# Patient Record
Sex: MALE | Race: WHITE | Employment: FULL TIME | URBAN - METROPOLITAN AREA
[De-identification: names, ages, dates, MRNs, and addresses within clinical notes are randomized per-mention and may not be internally consistent; named-entity substitution may affect disease eponyms.]

---

## 2017-04-11 ENCOUNTER — OFFICE VISIT (OUTPATIENT)
Dept: INTERNAL MEDICINE | Facility: CLINIC | Age: 32
End: 2017-04-11

## 2017-04-11 ENCOUNTER — HOSPITAL ENCOUNTER (INPATIENT)
Facility: CLINIC | Age: 32
LOS: 8 days | Discharge: HOME | DRG: 026 | End: 2017-04-19
Attending: EMERGENCY MEDICINE | Admitting: OTOLARYNGOLOGY
Payer: COMMERCIAL

## 2017-04-11 VITALS
DIASTOLIC BLOOD PRESSURE: 91 MMHG | WEIGHT: 246 LBS | OXYGEN SATURATION: 97 % | TEMPERATURE: 98.6 F | HEART RATE: 86 BPM | RESPIRATION RATE: 18 BRPM | SYSTOLIC BLOOD PRESSURE: 136 MMHG | BODY MASS INDEX: 34.31 KG/M2

## 2017-04-11 DIAGNOSIS — K59.03 DRUG-INDUCED CONSTIPATION: ICD-10-CM

## 2017-04-11 DIAGNOSIS — G96.00 CSF LEAK: ICD-10-CM

## 2017-04-11 DIAGNOSIS — R09.81 NASAL CONGESTION: ICD-10-CM

## 2017-04-11 DIAGNOSIS — G89.18 ACUTE POST-OPERATIVE PAIN: Primary | ICD-10-CM

## 2017-04-11 DIAGNOSIS — J34.89 DRAINAGE FROM NOSE: Primary | ICD-10-CM

## 2017-04-11 DIAGNOSIS — G96.01 CSF LEAK FROM NOSE: ICD-10-CM

## 2017-04-11 DIAGNOSIS — J34.89 DRAINAGE FROM NOSE: ICD-10-CM

## 2017-04-11 PROBLEM — R03.0 ELEVATED BLOOD PRESSURE READING WITHOUT DIAGNOSIS OF HYPERTENSION: Status: ACTIVE | Noted: 2017-04-11

## 2017-04-11 LAB
ALBUMIN SERPL-MCNC: 3.9 G/DL (ref 3.4–5)
ALP SERPL-CCNC: 67 U/L (ref 40–150)
ALT SERPL W P-5'-P-CCNC: 60 U/L (ref 0–70)
ANION GAP SERPL CALCULATED.3IONS-SCNC: 7 MMOL/L (ref 3–14)
AST SERPL W P-5'-P-CCNC: 42 U/L (ref 0–45)
BASOPHILS # BLD AUTO: 0.1 10E9/L (ref 0–0.2)
BASOPHILS NFR BLD AUTO: 0.6 %
BILIRUB SERPL-MCNC: 0.7 MG/DL (ref 0.2–1.3)
BUN SERPL-MCNC: 10 MG/DL (ref 7–30)
CALCIUM SERPL-MCNC: 9.3 MG/DL (ref 8.5–10.1)
CHLORIDE SERPL-SCNC: 103 MMOL/L (ref 94–109)
CO2 SERPL-SCNC: 28 MMOL/L (ref 20–32)
CREAT SERPL-MCNC: 0.82 MG/DL (ref 0.66–1.25)
DIFFERENTIAL METHOD BLD: NORMAL
EOSINOPHIL # BLD AUTO: 0.2 10E9/L (ref 0–0.7)
EOSINOPHIL NFR BLD AUTO: 2.1 %
ERYTHROCYTE [DISTWIDTH] IN BLOOD BY AUTOMATED COUNT: 13 % (ref 10–15)
GFR SERPL CREATININE-BSD FRML MDRD: NORMAL ML/MIN/1.7M2
GLUCOSE SERPL-MCNC: 81 MG/DL (ref 70–99)
GLUCOSE URINE: 250 MG/DL
HCT VFR BLD AUTO: 44.3 % (ref 40–53)
HGB BLD-MCNC: 15 G/DL (ref 13.3–17.7)
IMM GRANULOCYTES # BLD: 0.1 10E9/L (ref 0–0.4)
IMM GRANULOCYTES NFR BLD: 0.5 %
INR PPP: 1.08 (ref 0.86–1.14)
LYMPHOCYTES # BLD AUTO: 1.5 10E9/L (ref 0.8–5.3)
LYMPHOCYTES NFR BLD AUTO: 15.5 %
MCH RBC QN AUTO: 29.5 PG (ref 26.5–33)
MCHC RBC AUTO-ENTMCNC: 33.9 G/DL (ref 31.5–36.5)
MCV RBC AUTO: 87 FL (ref 78–100)
MONOCYTES # BLD AUTO: 0.5 10E9/L (ref 0–1.3)
MONOCYTES NFR BLD AUTO: 5 %
NEUTROPHILS # BLD AUTO: 7.4 10E9/L (ref 1.6–8.3)
NEUTROPHILS NFR BLD AUTO: 76.3 %
NRBC # BLD AUTO: 0 10*3/UL
NRBC BLD AUTO-RTO: 0 /100
PLATELET # BLD AUTO: 415 10E9/L (ref 150–450)
POTASSIUM SERPL-SCNC: 3.9 MMOL/L (ref 3.4–5.3)
PROT SERPL-MCNC: 8.4 G/DL (ref 6.8–8.8)
RBC # BLD AUTO: 5.08 10E12/L (ref 4.4–5.9)
SODIUM SERPL-SCNC: 139 MMOL/L (ref 133–144)
WBC # BLD AUTO: 9.7 10E9/L (ref 4–11)

## 2017-04-11 PROCEDURE — 99285 EMERGENCY DEPT VISIT HI MDM: CPT | Mod: 25 | Performed by: EMERGENCY MEDICINE

## 2017-04-11 PROCEDURE — 85610 PROTHROMBIN TIME: CPT | Performed by: EMERGENCY MEDICINE

## 2017-04-11 PROCEDURE — 25000132 ZZH RX MED GY IP 250 OP 250 PS 637: Performed by: OTOLARYNGOLOGY

## 2017-04-11 PROCEDURE — 25000125 ZZHC RX 250

## 2017-04-11 PROCEDURE — 09JK4ZZ INSPECTION OF NASAL MUCOSA AND SOFT TISSUE, PERCUTANEOUS ENDOSCOPIC APPROACH: ICD-10-PCS | Performed by: OTOLARYNGOLOGY

## 2017-04-11 PROCEDURE — 99285 EMERGENCY DEPT VISIT HI MDM: CPT | Mod: Z6 | Performed by: EMERGENCY MEDICINE

## 2017-04-11 PROCEDURE — 25000132 ZZH RX MED GY IP 250 OP 250 PS 637: Performed by: EMERGENCY MEDICINE

## 2017-04-11 PROCEDURE — 12000001 ZZH R&B MED SURG/OB UMMC

## 2017-04-11 RX ORDER — METOCLOPRAMIDE HYDROCHLORIDE 5 MG/ML
10 INJECTION INTRAMUSCULAR; INTRAVENOUS EVERY 6 HOURS PRN
Status: DISCONTINUED | OUTPATIENT
Start: 2017-04-11 | End: 2017-04-19 | Stop reason: HOSPADM

## 2017-04-11 RX ORDER — PROCHLORPERAZINE 25 MG
25 SUPPOSITORY, RECTAL RECTAL EVERY 12 HOURS PRN
Status: DISCONTINUED | OUTPATIENT
Start: 2017-04-11 | End: 2017-04-19 | Stop reason: HOSPADM

## 2017-04-11 RX ORDER — OXYCODONE HYDROCHLORIDE 5 MG/1
5-10 TABLET ORAL EVERY 4 HOURS PRN
Status: DISCONTINUED | OUTPATIENT
Start: 2017-04-11 | End: 2017-04-15

## 2017-04-11 RX ORDER — LIDOCAINE HYDROCHLORIDE 10 MG/ML
10 INJECTION, SOLUTION INFILTRATION; PERINEURAL
Status: DISCONTINUED | OUTPATIENT
Start: 2017-04-11 | End: 2017-04-19 | Stop reason: HOSPADM

## 2017-04-11 RX ORDER — LIDOCAINE HYDROCHLORIDE 10 MG/ML
INJECTION, SOLUTION INFILTRATION; PERINEURAL
Status: COMPLETED
Start: 2017-04-11 | End: 2017-04-11

## 2017-04-11 RX ORDER — PROCHLORPERAZINE MALEATE 5 MG
5-10 TABLET ORAL EVERY 6 HOURS PRN
Status: DISCONTINUED | OUTPATIENT
Start: 2017-04-11 | End: 2017-04-19 | Stop reason: HOSPADM

## 2017-04-11 RX ORDER — ACETAMINOPHEN 325 MG/1
650 TABLET ORAL EVERY 4 HOURS PRN
Status: DISCONTINUED | OUTPATIENT
Start: 2017-04-11 | End: 2017-04-15

## 2017-04-11 RX ORDER — ACETAMINOPHEN 500 MG
1000 TABLET ORAL ONCE
Status: COMPLETED | OUTPATIENT
Start: 2017-04-11 | End: 2017-04-11

## 2017-04-11 RX ORDER — POLYETHYLENE GLYCOL 3350 17 G/17G
17 POWDER, FOR SOLUTION ORAL DAILY PRN
Status: DISCONTINUED | OUTPATIENT
Start: 2017-04-11 | End: 2017-04-19 | Stop reason: HOSPADM

## 2017-04-11 RX ORDER — NALOXONE HYDROCHLORIDE 0.4 MG/ML
.1-.4 INJECTION, SOLUTION INTRAMUSCULAR; INTRAVENOUS; SUBCUTANEOUS
Status: DISCONTINUED | OUTPATIENT
Start: 2017-04-11 | End: 2017-04-19 | Stop reason: HOSPADM

## 2017-04-11 RX ORDER — BISACODYL 10 MG
10 SUPPOSITORY, RECTAL RECTAL DAILY PRN
Status: DISCONTINUED | OUTPATIENT
Start: 2017-04-11 | End: 2017-04-19 | Stop reason: HOSPADM

## 2017-04-11 RX ORDER — AMOXICILLIN 250 MG
1-2 CAPSULE ORAL 2 TIMES DAILY
Status: DISCONTINUED | OUTPATIENT
Start: 2017-04-11 | End: 2017-04-19 | Stop reason: HOSPADM

## 2017-04-11 RX ORDER — ONDANSETRON 2 MG/ML
4 INJECTION INTRAMUSCULAR; INTRAVENOUS EVERY 6 HOURS PRN
Status: DISCONTINUED | OUTPATIENT
Start: 2017-04-11 | End: 2017-04-19 | Stop reason: HOSPADM

## 2017-04-11 RX ORDER — BISACODYL 5 MG
5-15 TABLET, DELAYED RELEASE (ENTERIC COATED) ORAL DAILY PRN
Status: DISCONTINUED | OUTPATIENT
Start: 2017-04-11 | End: 2017-04-19 | Stop reason: HOSPADM

## 2017-04-11 RX ORDER — ONDANSETRON 4 MG/1
4 TABLET, ORALLY DISINTEGRATING ORAL EVERY 6 HOURS PRN
Status: DISCONTINUED | OUTPATIENT
Start: 2017-04-11 | End: 2017-04-19 | Stop reason: HOSPADM

## 2017-04-11 RX ORDER — SODIUM CHLORIDE 9 MG/ML
INJECTION, SOLUTION INTRAVENOUS CONTINUOUS
Status: DISCONTINUED | OUTPATIENT
Start: 2017-04-12 | End: 2017-04-19 | Stop reason: HOSPADM

## 2017-04-11 RX ORDER — METOCLOPRAMIDE 10 MG/1
10 TABLET ORAL EVERY 6 HOURS PRN
Status: DISCONTINUED | OUTPATIENT
Start: 2017-04-11 | End: 2017-04-19 | Stop reason: HOSPADM

## 2017-04-11 RX ADMIN — Medication 1 SPRAY: at 19:42

## 2017-04-11 RX ADMIN — LIDOCAINE HYDROCHLORIDE: 10 INJECTION, SOLUTION INFILTRATION; PERINEURAL at 19:35

## 2017-04-11 RX ADMIN — ACETAMINOPHEN 1000 MG: 500 TABLET, FILM COATED ORAL at 19:41

## 2017-04-11 RX ADMIN — OXYCODONE HYDROCHLORIDE 5 MG: 5 TABLET ORAL at 23:43

## 2017-04-11 RX ADMIN — SENNOSIDES AND DOCUSATE SODIUM 2 TABLET: 8.6; 5 TABLET ORAL at 22:32

## 2017-04-11 RX ADMIN — OXYCODONE HYDROCHLORIDE 5 MG: 5 TABLET ORAL at 22:28

## 2017-04-11 RX ADMIN — Medication 1 MG: at 22:28

## 2017-04-11 ASSESSMENT — ENCOUNTER SYMPTOMS
SPEECH DIFFICULTY: 0
CONSTIPATION: 0
VOMITING: 0
WEAKNESS: 0
CHILLS: 0
SHORTNESS OF BREATH: 0
RHINORRHEA: 1
HEADACHES: 1
PHOTOPHOBIA: 1
FEVER: 0
ABDOMINAL PAIN: 0
FATIGUE: 0
NUMBNESS: 0
NAUSEA: 0
COUGH: 1
DIARRHEA: 0

## 2017-04-11 ASSESSMENT — VISUAL ACUITY: OU: NORMAL ACUITY

## 2017-04-11 ASSESSMENT — PAIN SCALES - GENERAL: PAINLEVEL: MODERATE PAIN (4)
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
## 2017-04-11 NOTE — IP AVS SNAPSHOT
MRN:[id number]                      After Visit Summary   4/11/2017    Khadar Weems    MRN: [id number]           Thank you!     Thank you for choosing [facility] for your care. Our goal is always to provide you with excellent care. Hearing back from our patients is one way we can continue to improve our services. Please take a few minutes to complete the written survey that you may receive in the mail after you visit with us. Thank you!        Patient Information     Date Of Birth          1985        Designated Caregiver       Most Recent Value    Caregiver    Will someone help with your care after discharge? no      About your hospital stay     You were admitted on:  April 11, 2017 You last received care in the:  [address] [facility]    You were discharged on:  April 19, 2017        Reason for your hospital stay       Post-operative care                  Who to Call     For medical emergencies, please call 911.  For non-urgent questions about your medical care, please call your primary care provider or clinic, [phone]  For questions related to your surgery, please call your surgery clinic        Attending Provider     Provider Specialty    Liza De La Cruz MD Emergency Medicine    Marcelino Duvall MD Otolaryngology    Emily Person MD Otolaryngology       Primary Care Provider Office Phone # Fax #    DALE Saxena [facility] [phone]       [facility] [address] [place] [address]         When to contact your care team       Please notify your doctor if you experience persistent nasal drainage, sustained bleeding from the nose, or increasing redness, swelling, and/or purulent malorodorous discharge from the nose which may indicate infection. Notify your doctor if you experience worsening headaches, vision changes, neck pain/stiffness. If you feel it is acute, or experience sudden changes in breathing, chest pain, or  "excessive sleepiness/somnolence please return to the emergency department or call 911. If you have questions or concerns during the day please call ENT clinic and [phone]. If at night you can call [facility] at [phone] and ask for the \"ENT resident on call\".                  After Care Instructions     Activity       Your activity upon discharge: No heavy lifting greater than 10 lbs and no strenuous exercise for 2 weeks or until follow up appointment. No driving while taking narcotic pain medications.            Diet       Follow this diet upon discharge: Regular diet            Discharge Instructions       Use nasal saline spray to help remove crusting from the nose. No nose blowing, no straining, no lifting greater than 10 lbs, no strenuous activity.                  Follow-up Appointments     [facility] Follow-up and recommended labs and tests       Follow up in ENT clinic with Dr. Person in 2-3 weeks for removal of nasal splints. Please call the clinic with questions/concerns: [phone].    Otolaryngology/ENT Clinic:  [facility]  [address] [place] [address]      Appointments on [place] and/or [facility] (with [facility] or [facility] provider or service). Call [phone] if you haven't heard regarding these appointments within 7 days of discharge.                  Pending Results     Date and Time Order Name Status Description    4/14/2017 1506 Surgical pathology exam In process             Statement of Approval     Ordered          04/19/17 1247  I have reviewed and agree with all the recommendations and orders detailed in this document.  EFFECTIVE NOW     Approved and electronically signed by:  Olesya Landaverde PA-C             Admission Information     Date & Time Provider Department Dept. Phone    4/11/2017 Emily Person MD [address] [facility] [place] [phone]      Your Vitals Were     Blood Pressure Pulse " "Temperature Respirations Height Weight    144/96 105 96  F (35.6  C) (Oral) 16 1.803 m (5' 11\") 113.9 kg (251 lb 3.2 oz)    Pulse Oximetry BMI (Body Mass Index)                98% 35.04 kg/m2          [employer] Information     [employer] lets you send messages to your doctor, view your test results, renew your prescriptions, schedule appointments and more. To sign up, go to [email or web address] . Click on \"Log in\" on the left side of the screen, which will take you to the Welcome page. Then click on \"Sign up Now\" on the right side of the page.     You will be asked to enter the access code listed below, as well as some personal information. Please follow the directions to create your username and password.     Your access code is: [email or web address]  Expires: 7/10/2017  7:17 PM     Your access code will  in 90 days. If you need help or a new code, please call your [place] clinic or [phone].        Care EveryWhere ID     This is your Care EveryWhere ID. This could be used by other organizations to access your [place] medical records  [id number]           Review of your medicines      START taking        Dose / Directions    acetaminophen 325 MG tablet   Commonly known as:  TYLENOL   Used for:  Acute post-operative pain        Dose:  650 mg   Take 2 tablets (650 mg) by mouth every 4 hours   Quantity:  100 tablet   Refills:  1       bisacodyl 5 MG EC tablet   Used for:  Drug-induced constipation        Dose:  5-15 mg   Take 1-3 tablets (5-15 mg) by mouth daily as needed for constipation ( )   Quantity:  60 tablet   Refills:  0       oxyCODONE 5 MG IR tablet   Commonly known as:  ROXICODONE   Used for:  Acute post-operative pain        Dose:  5-10 mg   Take 1-2 tablets (5-10 mg) by mouth every 3 hours as needed for moderate to severe pain   Quantity:  50 tablet   Refills:  0       polyethylene glycol Packet   Commonly known as:  MIRALAX/GLYCOLAX   Used for:  Drug-induced constipation        Dose:  17 g   Take 17 " g by mouth daily as needed for constipation   Quantity:  14 packet   Refills:  0       senna-docusate 8.6-50 MG per tablet   Commonly known as:  SENOKOT-S;PERICOLACE   Used for:  Drug-induced constipation        Dose:  1-2 tablet   Take 1-2 tablets by mouth 2 times daily   Quantity:  50 tablet   Refills:  0       sodium chloride 0.65 % nasal spray   Commonly known as:  OCEAN   Used for:  Nasal congestion        Dose:  1 spray   Spray 1 spray into both nostrils daily as needed for congestion   Quantity:  2 Bottle   Refills:  2         CONTINUE these medicines which have NOT CHANGED        Dose / Directions    cetirizine 10 MG tablet   Commonly known as:  zyrTEC        Dose:  10 mg   Take 10 mg by mouth daily   Refills:  0       ginger root 550 MG Caps capsule        Dose:  550 mg   Take 550 mg by mouth daily   Refills:  0       loratadine 10 MG tablet   Commonly known as:  CLARITIN   Used for:  Sensation of fullness in ear        Dose:  10 mg   Take 1 tablet by mouth daily.   Quantity:  90 tablet   Refills:  3         STOP taking     IBUPROFEN IB OR           pseudoePHEDrine 120 MG 12 hr tablet   Commonly known as:  SUDAFED                Where to get your medicines      These medications were sent to [facility] [place], [place] - [address] [address]     Phone:  [phone]     acetaminophen 325 MG tablet    bisacodyl 5 MG EC tablet    polyethylene glycol Packet    senna-docusate 8.6-50 MG per tablet    sodium chloride 0.65 % nasal spray         Some of these will need a paper prescription and others can be bought over the counter. Ask your nurse if you have questions.     Bring a paper prescription for each of these medications     oxyCODONE 5 MG IR tablet                Protect others around you: Learn how to safely use, store and throw away your medicines at www.disposemymeds.org.             Medication List: This is a list of all your medications and  when to take them. Check marks below indicate your daily home schedule. Keep this list as a reference.      Medications           Morning Afternoon Evening Bedtime As Needed    acetaminophen 325 MG tablet   Commonly known as:  TYLENOL   Take 2 tablets (650 mg) by mouth every 4 hours   Last time this was given:  650 mg on 4/19/2017 12:35 PM                                bisacodyl 5 MG EC tablet   Take 1-3 tablets (5-15 mg) by mouth daily as needed for constipation ( )   Last time this was given:  15 mg on 4/18/2017  1:27 PM                                cetirizine 10 MG tablet   Commonly known as:  zyrTEC   Take 10 mg by mouth daily                                ginger root 550 MG Caps capsule   Take 550 mg by mouth daily                                loratadine 10 MG tablet   Commonly known as:  CLARITIN   Take 1 tablet by mouth daily.                                oxyCODONE 5 MG IR tablet   Commonly known as:  ROXICODONE   Take 1-2 tablets (5-10 mg) by mouth every 3 hours as needed for moderate to severe pain   Last time this was given:  10 mg on 4/19/2017 12:34 PM                                polyethylene glycol Packet   Commonly known as:  MIRALAX/GLYCOLAX   Take 17 g by mouth daily as needed for constipation   Last time this was given:  17 g on 4/18/2017  1:35 PM                                senna-docusate 8.6-50 MG per tablet   Commonly known as:  SENOKOT-S;PERICOLACE   Take 1-2 tablets by mouth 2 times daily   Last time this was given:  2 tablets on 4/19/2017  7:42 AM                                sodium chloride 0.65 % nasal spray   Commonly known as:  OCEAN   Spray 1 spray into both nostrils daily as needed for congestion

## 2017-04-11 NOTE — ED NOTES
"Triage Assessment & Note:    BP (!) 169/102  Pulse 102  Temp 98.2  F (36.8  C) (Oral)  Resp 16  Ht 1.803 m (5' 11\")  Wt 112.5 kg (248 lb)  SpO2 98%  BMI 34.59 kg/m2    Patient presents with: Pt c/o clear nasal drainage that started last Friday. It was then followed by a sever headache and fever. Pt also reports new neck and back pain on Friday and Saturday, light and sound sensitivity.     Home Treatments/Remedies: OTC allergy meds    Febrile / Afebrile? Afebrial    Duration of C/o: 4 days    Tom Huff  April 11, 2017      "
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
## 2017-04-11 NOTE — NURSING NOTE
Chief Complaint   Patient presents with     Sinus Problem     Here for clear liquid dripping from right nostril nose and also here for fevers, headache, and SOB when walking     Trae Walton CMA at 4:12 PM on 4/11/2017

## 2017-04-11 NOTE — IP AVS SNAPSHOT
[address] [address]    Phone:  [phone]                                       After Visit Summary   4/11/2017    Khadar Weems    MRN: [id number]           After Visit Summary Signature Page     I have received my discharge instructions, and my questions have been answered. I have discussed any challenges I see with this plan with the nurse or doctor.    ..........................................................................................................................................  Patient/Patient Representative Signature      ..........................................................................................................................................  Patient Representative Print Name and Relationship to Patient    ..................................................               ................................................  Date                                            Time    ..........................................................................................................................................  Reviewed by Signature/Title    ...................................................              ..............................................  Date                                                            Time

## 2017-04-11 NOTE — MR AVS SNAPSHOT
After Visit Summary   4/11/2017    Khadar Weems    MRN: [id number]           Patient Information     Date Of Birth          1985        Visit Information        Provider Department      4/11/2017 4:00 PM Milagros Holt APRN [facility]        Today's Diagnoses     Drainage from nose    -  1    CSF leak from nose          Care Instructions    Primary Care Center Medication Refill Request Information:  * Please contact your pharmacy regarding ANY request for medication refills.  ** [facility] Prescription Fax = [phone]  * Please allow 3 business days for routine medication refills.  * Please allow 5 business days for controlled substance medication refills.     Primary Care Center Test Result notification information:  *You will be notified with in 7-10 days of your appointment day regarding the results of your test.  If you are on MyChart you will be notified as soon as the provider has reviewed the results and signed off on them.    [facility] [phone]               Follow-ups after your visit        Who to contact     Please call your clinic at [phone] to:    Ask questions about your health    Make or cancel appointments    Discuss your medicines    Learn about your test results    Speak to your doctor   If you have compliments or concerns about an experience at your clinic, or if you wish to file a complaint, please contact [facility] Patient Relations at [phone] or email us at [email or web address]         Additional Information About Your Visit        MyChart Information     Vinvelit is an electronic gateway that provides easy, online access to your medical records. With MyCVideologyt, you can request a clinic appointment, read your test results, renew a prescription or communicate with your care team.     To sign up for Vinvelit visit the website at [email or web address]   You will be asked to enter the  access code listed below, as well as some personal information. Please follow the directions to create your username and password.     Your access code is: [email or web address]  Expires: 7/10/2017  7:17 PM     Your access code will  in 90 days. If you need help or a new code, please contact your [facility] or call [phone] for assistance.        Care EveryWhere ID     This is your Care EveryWhere ID. This could be used by other organizations to access your [facility] medical records  [id number]        Your Vitals Were     Pulse Temperature Respirations Pulse Oximetry BMI (Body Mass Index)       86 98.6  F (37  C) (Axillary) 18 97% 34.31 kg/m2        Blood Pressure from Last 3 Encounters:   17 (!) 150/104   17 (!) 136/91   12 120/80    Weight from Last 3 Encounters:   17 112.5 kg (248 lb)   17 111.6 kg (246 lb)   12 111.1 kg (245 lb)              We Performed the Following     Beta 2 transferrin     Glucose urine POCT        Primary Care Provider Office Phone # Fax #    [facility] [phone]       [facility] [address] [place] [address]        Thank you!     Thank you for choosing [facility]  for your care. Our goal is always to provide you with excellent care. Hearing back from our patients is one way we can continue to improve our services. Please take a few minutes to complete the written survey that you may receive in the mail after your visit with us. Thank you!             Your Updated Medication List - Protect others around you: Learn how to safely use, store and throw away your medicines at www.disposemymeds.org.          This list is accurate as of: 17  6:28 PM.  Always use your most recent med list.                   Brand Name Dispense Instructions for use    IBUPROFEN IB OR      Take  by mouth.       loratadine 10 MG tablet    CLARITIN    90 tablet     Take 1 tablet by mouth daily.       NAPROXEN

## 2017-04-11 NOTE — PATIENT INSTRUCTIONS
[facility] Medication Refill Request Information:  * Please contact your pharmacy regarding ANY request for medication refills.  ** [facility] Prescription Fax = [phone]  * Please allow 3 business days for routine medication refills.  * Please allow 5 business days for controlled substance medication refills.     [facility] Test Result notification information:  *You will be notified with in 7-10 days of your appointment day regarding the results of your test.  If you are on MyChart you will be notified as soon as the provider has reviewed the results and signed off on them.    [facility] [phone]

## 2017-04-12 ENCOUNTER — APPOINTMENT (OUTPATIENT)
Dept: MRI IMAGING | Facility: CLINIC | Age: 32
DRG: 026 | End: 2017-04-12
Payer: COMMERCIAL

## 2017-04-12 LAB
B2 TRANSFERRIN FLD QL: NORMAL
ERYTHROCYTE [DISTWIDTH] IN BLOOD BY AUTOMATED COUNT: 13.5 % (ref 10–15)
HCT VFR BLD AUTO: 42 % (ref 40–53)
HGB BLD-MCNC: 14.2 G/DL (ref 13.3–17.7)
MCH RBC QN AUTO: 29.8 PG (ref 26.5–33)
MCHC RBC AUTO-ENTMCNC: 33.8 G/DL (ref 31.5–36.5)
MCV RBC AUTO: 88 FL (ref 78–100)
PLATELET # BLD AUTO: 324 10E9/L (ref 150–450)
RBC # BLD AUTO: 4.77 10E12/L (ref 4.4–5.9)
WBC # BLD AUTO: 11 10E9/L (ref 4–11)

## 2017-04-12 PROCEDURE — 25000128 H RX IP 250 OP 636: Performed by: OTOLARYNGOLOGY

## 2017-04-12 PROCEDURE — 25500064 ZZH RX 255 OP 636: Performed by: OTOLARYNGOLOGY

## 2017-04-12 PROCEDURE — 36415 COLL VENOUS BLD VENIPUNCTURE: CPT | Performed by: OTOLARYNGOLOGY

## 2017-04-12 PROCEDURE — 12000001 ZZH R&B MED SURG/OB UMMC

## 2017-04-12 PROCEDURE — A9585 GADOBUTROL INJECTION: HCPCS | Performed by: OTOLARYNGOLOGY

## 2017-04-12 PROCEDURE — 70543 MRI ORBT/FAC/NCK W/O &W/DYE: CPT

## 2017-04-12 PROCEDURE — 85027 COMPLETE CBC AUTOMATED: CPT | Performed by: OTOLARYNGOLOGY

## 2017-04-12 PROCEDURE — 25000132 ZZH RX MED GY IP 250 OP 250 PS 637: Performed by: OTOLARYNGOLOGY

## 2017-04-12 PROCEDURE — 40000556 ZZH STATISTIC PERIPHERAL IV START W US GUIDANCE

## 2017-04-12 PROCEDURE — 27210995 ZZH RX 272

## 2017-04-12 RX ORDER — CALCIUM CARBONATE 500 MG/1
500 TABLET, CHEWABLE ORAL 3 TIMES DAILY PRN
Status: DISCONTINUED | OUTPATIENT
Start: 2017-04-12 | End: 2017-04-19 | Stop reason: HOSPADM

## 2017-04-12 RX ORDER — GADOBUTROL 604.72 MG/ML
10 INJECTION INTRAVENOUS ONCE
Status: COMPLETED | OUTPATIENT
Start: 2017-04-12 | End: 2017-04-12

## 2017-04-12 RX ORDER — DIPHENHYDRAMINE HCL 25 MG
25 CAPSULE ORAL EVERY 6 HOURS PRN
Status: DISCONTINUED | OUTPATIENT
Start: 2017-04-12 | End: 2017-04-19 | Stop reason: HOSPADM

## 2017-04-12 RX ORDER — MORPHINE SULFATE 0.5 MG/ML
0.5 INJECTION, SOLUTION EPIDURAL; INTRATHECAL; INTRAVENOUS EVERY 4 HOURS PRN
Status: DISCONTINUED | OUTPATIENT
Start: 2017-04-12 | End: 2017-04-12

## 2017-04-12 RX ORDER — MORPHINE SULFATE 2 MG/ML
0.5 INJECTION, SOLUTION INTRAMUSCULAR; INTRAVENOUS EVERY 4 HOURS PRN
Status: DISCONTINUED | OUTPATIENT
Start: 2017-04-12 | End: 2017-04-15

## 2017-04-12 RX ADMIN — SODIUM CHLORIDE, PRESERVATIVE FREE: 5 INJECTION INTRAVENOUS at 20:22

## 2017-04-12 RX ADMIN — ACETAMINOPHEN 650 MG: 325 TABLET, FILM COATED ORAL at 15:29

## 2017-04-12 RX ADMIN — OXYCODONE HYDROCHLORIDE 10 MG: 5 TABLET ORAL at 06:35

## 2017-04-12 RX ADMIN — SODIUM CHLORIDE, PRESERVATIVE FREE: 5 INJECTION INTRAVENOUS at 00:29

## 2017-04-12 RX ADMIN — OXYCODONE HYDROCHLORIDE 10 MG: 5 TABLET ORAL at 11:15

## 2017-04-12 RX ADMIN — ACETAMINOPHEN 650 MG: 325 TABLET, FILM COATED ORAL at 11:15

## 2017-04-12 RX ADMIN — MORPHINE SULFATE 0.5 MG: 2 INJECTION, SOLUTION INTRAMUSCULAR; INTRAVENOUS at 09:21

## 2017-04-12 RX ADMIN — SENNOSIDES AND DOCUSATE SODIUM 2 TABLET: 8.6; 5 TABLET ORAL at 08:48

## 2017-04-12 RX ADMIN — OXYCODONE HYDROCHLORIDE 10 MG: 5 TABLET ORAL at 15:29

## 2017-04-12 RX ADMIN — OXYCODONE HYDROCHLORIDE 10 MG: 5 TABLET ORAL at 20:16

## 2017-04-12 RX ADMIN — GADOBUTROL 10 ML: 604.72 INJECTION INTRAVENOUS at 19:56

## 2017-04-12 RX ADMIN — MORPHINE SULFATE 0.5 MG: 2 INJECTION, SOLUTION INTRAMUSCULAR; INTRAVENOUS at 23:57

## 2017-04-12 RX ADMIN — SENNOSIDES AND DOCUSATE SODIUM 2 TABLET: 8.6; 5 TABLET ORAL at 15:30

## 2017-04-12 RX ADMIN — Medication 1 MG: at 23:56

## 2017-04-12 ASSESSMENT — VISUAL ACUITY
OU: NORMAL ACUITY

## 2017-04-12 ASSESSMENT — PAIN DESCRIPTION - DESCRIPTORS
DESCRIPTORS: ACHING
DESCRIPTORS: HEADACHE
DESCRIPTORS: HEADACHE

## 2017-04-12 NOTE — PROGRESS NOTES
Admission medication history interview status for the 4/11/2017 admission is complete. See Epic admission navigator for allergy information, pharmacy, prior to admission medications and immunization status.     Medication history interview sources:  Pt Interview     Changes made to PTA medication list (reason)  Added: NA  Deleted:   -Naproxen (Pateint no longer takes)  Changed: NA    Additional medication history information (including reliability of information, actions taken by pharmacist):  -Patient received influenza vaccine in the fall  -Patient states he takes valerian root and ginger root infrequently, none recently       Prior to Admission medications    Medication Sig Last Dose Taking? Auth Provider   IBUPROFEN IB OR Take 800 mg by mouth 2 times daily as needed  4/11/2017 at am Yes Reported, Patient   loratadine (CLARITIN) 10 MG tablet Take 1 tablet by mouth daily. Past Week at Unknown time Yes Debbie Mckeon APRN CNP         Medication history completed by: Debo Gregg Pharm D. Student
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
## 2017-04-12 NOTE — PROGRESS NOTES
"Otolaryngology Daily Progress Note  4/12/2017    S: Continues to have a moderate, unchanged headache behind the right eye. Persistent nasal drainage and drainage down the back of the throat. Afebrile.     Objective:  /88 (BP Location: Right arm)  Pulse 81  Temp 96.8  F (36  C) (Oral)  Resp 16  Ht 1.803 m (5' 11\")  Wt 112.5 kg (248 lb)  SpO2 96%  BMI 34.59 kg/m2   General: Alert and oriented x 3, No acute distress   HEENT: EOMI. PERRL, No facial droop. Clear rhinorrhea on the right   Pulmonary: Breathing non-labored, no stridor, no accessory muscle use.      Intake/Output Summary (Last 24 hours) at 04/12/17 0844  Last data filed at 04/12/17 0700   Gross per 24 hour   Intake           651.67 ml   Output                0 ml   Net           651.67 ml       LABS:  ROUTINE IP LABS (Last four results)  BMP  Recent Labs  Lab 04/11/17  1804      POTASSIUM 3.9   CHLORIDE 103   RAZA 9.3   CO2 28   BUN 10   CR 0.82   GLC 81     CBC  Recent Labs  Lab 04/12/17  0736 04/11/17  1804   WBC 11.0 9.7   RBC 4.77 5.08   HGB 14.2 15.0   HCT 42.0 44.3   MCV 88 87   MCH 29.8 29.5   MCHC 33.8 33.9   RDW 13.5 13.0    415     INR  Recent Labs  Lab 04/11/17  1840   INR 1.08       Imaging: CT scan obtained yesterday - have contacted radiology who will reformat the images to have thinner, 0.6 mm cuts.     Assessment/Plan:  Mr. Weems is a 31 year old man with spontaneous CSF rhinorrhea. No signs of meningitis at this time. Appreciate neurosurgery following along. Will order MRI today and will get reformatted CT. Will add on for the OR Friday. Continue to monitor for signs of meningitis.      Patient and plan will be discussed with Dr. Raza Castellano MD  Otolaryngology Resident  Please contact ENT with questions by dialing * * [phone] and entering job code [phone] when prompted      "

## 2017-04-12 NOTE — PROGRESS NOTES
Patient seen at bedside.  Spontaneous CSF leak in posterior wall of sphenoid sinus.  We will obtain an MRI tonight to rule out a mass in the area affecting the bone.  Discussed surgical repair with the patient.  I recommend an endoscopic approach with inlay and onlay grafting with a nasoseptal flap.  Will likely have lumbar drain placed by NSG, will open depending on size of defect and rate of flow.  Answered patient questions to the best of my ability.

## 2017-04-12 NOTE — PLAN OF CARE
Pt alert, orientated, neuros intact, pt has continuous serous fluid leak from right nostril, moderate amount, if pt lies flat fluid runs down throat and pt has cough that is nonproductive but increase his headache, using tylenol and oxycodone q 4 hours with prn morphine IV for breakthrough headache pain. Very good po intake, drinking lots of water, good food intake and remains on iv fluids, up ind, reports constipation with narcotics, took 4 senna today and awaiting BM, voiding good amounts, plan for MRI this leobardo, regular diet as surgical procedure un hold until possible Friday
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
## 2017-04-12 NOTE — PLAN OF CARE
Pt admitted to [facility] from [facility] at 2100 with dx of sphenoid sinus defect w/ CSF leak, primary team ENT. Neurosurg consulted. Pt c/o fluid leaking from nose when upright, states improves positionally. C/o headache unrelieved with tylenol, PRN oxycodone ordered and given with moderate relief, 10mg x2. PRN melatonin given to promote sleep. VSS, A/O, up independently and steady on feet. NPO since 0000, NS running at 100mL/hr. Denies nausea. Senna given, last BM 4/10 and pt states hx of constipation with pain meds. Denies any difficulties voiding. New L PIV placed by vascular overnight, occluded R PIV removed. Plan for repair of sphenoid sinus and CSF leak today with placement of lumbar drain. Pt will then need to go to 6A following drain placement. Continue to monitor and follow POC.

## 2017-04-13 ENCOUNTER — APPOINTMENT (OUTPATIENT)
Dept: CT IMAGING | Facility: CLINIC | Age: 32
DRG: 026 | End: 2017-04-13
Attending: OTOLARYNGOLOGY
Payer: COMMERCIAL

## 2017-04-13 LAB
ERYTHROCYTE [DISTWIDTH] IN BLOOD BY AUTOMATED COUNT: 13.5 % (ref 10–15)
HCT VFR BLD AUTO: 41.2 % (ref 40–53)
HGB BLD-MCNC: 13.7 G/DL (ref 13.3–17.7)
MCH RBC QN AUTO: 29.7 PG (ref 26.5–33)
MCHC RBC AUTO-ENTMCNC: 33.3 G/DL (ref 31.5–36.5)
MCV RBC AUTO: 89 FL (ref 78–100)
PLATELET # BLD AUTO: 330 10E9/L (ref 150–450)
RBC # BLD AUTO: 4.62 10E12/L (ref 4.4–5.9)
WBC # BLD AUTO: 11.5 10E9/L (ref 4–11)

## 2017-04-13 PROCEDURE — 25000132 ZZH RX MED GY IP 250 OP 250 PS 637: Performed by: OTOLARYNGOLOGY

## 2017-04-13 PROCEDURE — 36415 COLL VENOUS BLD VENIPUNCTURE: CPT | Performed by: OTOLARYNGOLOGY

## 2017-04-13 PROCEDURE — 25000128 H RX IP 250 OP 636: Performed by: OTOLARYNGOLOGY

## 2017-04-13 PROCEDURE — 25000132 ZZH RX MED GY IP 250 OP 250 PS 637: Performed by: STUDENT IN AN ORGANIZED HEALTH CARE EDUCATION/TRAINING PROGRAM

## 2017-04-13 PROCEDURE — 70486 CT MAXILLOFACIAL W/O DYE: CPT

## 2017-04-13 PROCEDURE — 12000008 ZZH R&B INTERMEDIATE UMMC

## 2017-04-13 PROCEDURE — 85027 COMPLETE CBC AUTOMATED: CPT | Performed by: OTOLARYNGOLOGY

## 2017-04-13 RX ORDER — CEFTRIAXONE 2 G/1
2 INJECTION, POWDER, FOR SOLUTION INTRAMUSCULAR; INTRAVENOUS
Status: COMPLETED | OUTPATIENT
Start: 2017-04-14 | End: 2017-04-14

## 2017-04-13 RX ORDER — GINGER ROOT 550 MG
550 CAPSULE ORAL DAILY
COMMUNITY
End: 2021-07-28

## 2017-04-13 RX ORDER — PSEUDOEPHEDRINE HCL 120 MG/1
120 TABLET, FILM COATED, EXTENDED RELEASE ORAL EVERY 12 HOURS
Status: ON HOLD | COMMUNITY
End: 2017-04-19

## 2017-04-13 RX ORDER — CETIRIZINE HYDROCHLORIDE 10 MG/1
10 TABLET ORAL DAILY
COMMUNITY
End: 2021-07-28

## 2017-04-13 RX ADMIN — OXYCODONE HYDROCHLORIDE 10 MG: 5 TABLET ORAL at 14:04

## 2017-04-13 RX ADMIN — SODIUM CHLORIDE, PRESERVATIVE FREE: 5 INJECTION INTRAVENOUS at 06:10

## 2017-04-13 RX ADMIN — OXYCODONE HYDROCHLORIDE 10 MG: 5 TABLET ORAL at 09:59

## 2017-04-13 RX ADMIN — ACETAMINOPHEN 650 MG: 325 TABLET, FILM COATED ORAL at 05:00

## 2017-04-13 RX ADMIN — MORPHINE SULFATE 0.5 MG: 2 INJECTION, SOLUTION INTRAMUSCULAR; INTRAVENOUS at 08:11

## 2017-04-13 RX ADMIN — MORPHINE SULFATE 0.5 MG: 2 INJECTION, SOLUTION INTRAMUSCULAR; INTRAVENOUS at 12:23

## 2017-04-13 RX ADMIN — SENNOSIDES AND DOCUSATE SODIUM 2 TABLET: 8.6; 5 TABLET ORAL at 08:11

## 2017-04-13 RX ADMIN — ACETAMINOPHEN 650 MG: 325 TABLET, FILM COATED ORAL at 14:04

## 2017-04-13 RX ADMIN — OXYCODONE HYDROCHLORIDE 10 MG: 5 TABLET ORAL at 22:24

## 2017-04-13 RX ADMIN — POLYETHYLENE GLYCOL 3350 17 G: 17 POWDER, FOR SOLUTION ORAL at 14:04

## 2017-04-13 RX ADMIN — MORPHINE SULFATE 0.5 MG: 2 INJECTION, SOLUTION INTRAMUSCULAR; INTRAVENOUS at 20:58

## 2017-04-13 RX ADMIN — MORPHINE SULFATE 0.5 MG: 2 INJECTION, SOLUTION INTRAMUSCULAR; INTRAVENOUS at 16:53

## 2017-04-13 RX ADMIN — ACETAMINOPHEN 650 MG: 325 TABLET, FILM COATED ORAL at 22:24

## 2017-04-13 RX ADMIN — SENNOSIDES AND DOCUSATE SODIUM 2 TABLET: 8.6; 5 TABLET ORAL at 20:58

## 2017-04-13 RX ADMIN — ACETAMINOPHEN 650 MG: 325 TABLET, FILM COATED ORAL at 18:02

## 2017-04-13 RX ADMIN — OXYCODONE HYDROCHLORIDE 10 MG: 5 TABLET ORAL at 18:02

## 2017-04-13 RX ADMIN — SODIUM CHLORIDE, PRESERVATIVE FREE: 5 INJECTION INTRAVENOUS at 16:55

## 2017-04-13 RX ADMIN — ACETAMINOPHEN 650 MG: 325 TABLET, FILM COATED ORAL at 09:59

## 2017-04-13 RX ADMIN — DIPHENHYDRAMINE HYDROCHLORIDE 25 MG: 25 CAPSULE ORAL at 22:25

## 2017-04-13 RX ADMIN — OXYCODONE HYDROCHLORIDE 10 MG: 5 TABLET ORAL at 06:09

## 2017-04-13 ASSESSMENT — VISUAL ACUITY: OU: NORMAL ACUITY

## 2017-04-13 ASSESSMENT — PAIN DESCRIPTION - DESCRIPTORS: DESCRIPTORS: HEADACHE
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
## 2017-04-13 NOTE — PROGRESS NOTES
"Neurosurgery Daily Progress Note  4/13/2017    Overnight events/subjective: no events overnight   O/ /84 (BP Location: Right arm)  Pulse 84  Temp 97.9  F (36.6  C) (Oral)  Resp 18  Ht 1.803 m (5' 11\")  Wt 112.5 kg (248 lb)  SpO2 93%  BMI 34.59 kg/m2  Exam:   Gen: Laying in bed, not in acute distress  MS: A&Ox3, Speech fluent and conversant   CN: Pupils round and reactive, extraocular movements intact, face symmetric, tongue midline, uvula & palate elevate symmetrically   Motor: 5/5 in b/l UE& LEs  Sensory: intact to light touch   No drift  Brisk clear fluid when patient leans forward     Non labored breathing    IMG:  MR orbit performed for operating planning by ENT     LABS: beta-2 transferrin positive; WBC remains normal      A/P: Khadar Weems is a 31 year old M w/ spontaneous CSF leak   -neurosurgery available to assist with lumbar drain placement intraoperatively, tentative surgery Friday per ENT     Tom Sierra MD   Neurosurgery PGY-3  Please contact the neurosurgery resident on call with questions by dialing * * [phone], then entering [phone] when prompted        "

## 2017-04-13 NOTE — PLAN OF CARE
Pt neuro's remain intact, CT done this leobardo, VSS, continues to have headache similar to yesterday, using tylenol and oxycodone q 4 hours, iv morphine in between po meds for breakthrough pain, pt remains on iv fluids and drinking lots  of water, continues to have moderate amount  serous fluid leak from right nostril and needs to have tissue at site continuously unless lying back in bed then fluid drains down throat, intermittent cough that make headache pain worse

## 2017-04-13 NOTE — PLAN OF CARE
Problem: Goal Outcome Summary  Goal: Goal Outcome Summary  Patient alert and oriented x 4. Vitals stable on room air. No changes neuro status. Up independently. Denies dizziness no nausea, vomiting or SOB Continuous to have fluid leak from right nostril. Po tylenol, oxycodone and iv morphine given for headache with some relief. Left PIV patent infusing NS at 100 ml/hr.Tolerating regular diet well. Voiding good amounts. No stool this shift. MRI done, possible surgical procedure on Friday. Patient able to sleep between cares. Will continue to monitor and follow plan of care.

## 2017-04-13 NOTE — PHARMACY-ADMISSION MEDICATION HISTORY
Admission medication history interview status for the 4/11/2017 admission is complete. See Epic admission navigator for allergy information, pharmacy, prior to admission medications and immunization status.     Medication history interview sources:  Patient    Changes made to PTA medication list (reason)  Added:   Tried all 3 medications listed below only a few times to help with nasal drainage. Not chronically taking these mediations.   Cetirizine 10mg tablet- Take 10mg by mouth daily.  Pseudoephedrine 120mg tablet- Take 120mg by mouth every 12 hours  Ginger root 550mg capsules- Take 550 my by mouth daily  Deleted: None  Changed: None    Additional medication history information (including reliability of information, actions taken by pharmacist):  Reliability- Good  Flu vaccination - Per patient and [facility], patient received flu vaccination on 10/01/16    Prior to Admission medications    Medication Sig Last Dose Taking? Auth Provider   cetirizine (ZYRTEC) 10 MG tablet Take 10 mg by mouth daily Past Week at Unknown time Yes Unknown, Entered By History   pseudoePHEDrine (SUDAFED) 120 MG 12 hr tablet Take 120 mg by mouth every 12 hours Past Week at Unknown time Yes Unknown, Entered By History   Ginger, Zingiber officinalis, (GINGER ROOT) 550 MG CAPS capsule Take 550 mg by mouth daily Past Week at Unknown time Yes Unknown, Entered By History   IBUPROFEN IB OR Take 800 mg by mouth 2 times daily as needed  4/11/2017 at am Yes Reported, Patient   loratadine (CLARITIN) 10 MG tablet Take 1 tablet by mouth daily. Past Week at Unknown time Yes Debbie Mckeon APRN CNP     Medication history completed by: Ge Vo, PharmD Student

## 2017-04-14 ENCOUNTER — ANESTHESIA EVENT (OUTPATIENT)
Dept: SURGERY | Facility: CLINIC | Age: 32
DRG: 026 | End: 2017-04-14
Payer: COMMERCIAL

## 2017-04-14 ENCOUNTER — ANESTHESIA (OUTPATIENT)
Dept: SURGERY | Facility: CLINIC | Age: 32
DRG: 026 | End: 2017-04-14
Payer: COMMERCIAL

## 2017-04-14 LAB
BASE EXCESS BLDA CALC-SCNC: 2.6 MMOL/L
CA-I BLD-MCNC: 4.9 MG/DL (ref 4.4–5.2)
GLUCOSE BLD-MCNC: 123 MG/DL (ref 70–99)
HCO3 BLD-SCNC: 28 MMOL/L (ref 21–28)
HGB BLD-MCNC: 14.1 G/DL (ref 13.3–17.7)
O2/TOTAL GAS SETTING VFR VENT: ABNORMAL %
PCO2 BLD: 46 MM HG (ref 35–45)
PH BLD: 7.39 PH (ref 7.35–7.45)
PO2 BLD: 273 MM HG (ref 80–105)
POTASSIUM BLD-SCNC: 4.5 MMOL/L (ref 3.4–5.3)
SODIUM BLD-SCNC: 140 MMOL/L (ref 133–144)

## 2017-04-14 PROCEDURE — 25000128 H RX IP 250 OP 636: Performed by: PHYSICIAN ASSISTANT

## 2017-04-14 PROCEDURE — 25000125 ZZHC RX 250: Performed by: OTOLARYNGOLOGY

## 2017-04-14 PROCEDURE — 88342 IMHCHEM/IMCYTCHM 1ST ANTB: CPT | Performed by: OTOLARYNGOLOGY

## 2017-04-14 PROCEDURE — 40000170 ZZH STATISTIC PRE-PROCEDURE ASSESSMENT II: Performed by: OTOLARYNGOLOGY

## 2017-04-14 PROCEDURE — C1762 CONN TISS, HUMAN(INC FASCIA): HCPCS | Performed by: OTOLARYNGOLOGY

## 2017-04-14 PROCEDURE — 25000132 ZZH RX MED GY IP 250 OP 250 PS 637: Performed by: OTOLARYNGOLOGY

## 2017-04-14 PROCEDURE — 36000076 ZZH SURGERY LEVEL 6 EA 15 ADDTL MIN - UMMC: Performed by: OTOLARYNGOLOGY

## 2017-04-14 PROCEDURE — 88312 SPECIAL STAINS GROUP 1: CPT | Performed by: OTOLARYNGOLOGY

## 2017-04-14 PROCEDURE — 25000128 H RX IP 250 OP 636: Performed by: ANESTHESIOLOGY

## 2017-04-14 PROCEDURE — 37000008 ZZH ANESTHESIA TECHNICAL FEE, 1ST 30 MIN: Performed by: OTOLARYNGOLOGY

## 2017-04-14 PROCEDURE — 25000125 ZZHC RX 250: Performed by: NURSE ANESTHETIST, CERTIFIED REGISTERED

## 2017-04-14 PROCEDURE — 009U30Z DRAINAGE OF SPINAL CANAL WITH DRAINAGE DEVICE, PERCUTANEOUS APPROACH: ICD-10-PCS | Performed by: OTOLARYNGOLOGY

## 2017-04-14 PROCEDURE — 00U147Z SUPPLEMENT CEREBRAL MENINGES WITH AUTOLOGOUS TISSUE SUBSTITUTE, PERCUTANEOUS ENDOSCOPIC APPROACH: ICD-10-PCS | Performed by: OTOLARYNGOLOGY

## 2017-04-14 PROCEDURE — 88305 TISSUE EXAM BY PATHOLOGIST: CPT | Performed by: OTOLARYNGOLOGY

## 2017-04-14 PROCEDURE — 25000565 ZZH ISOFLURANE, EA 15 MIN: Performed by: OTOLARYNGOLOGY

## 2017-04-14 PROCEDURE — 00U14KZ SUPPLEMENT CEREBRAL MENINGES WITH NONAUTOLOGOUS TISSUE SUBSTITUTE, PERCUTANEOUS ENDOSCOPIC APPROACH: ICD-10-PCS | Performed by: OTOLARYNGOLOGY

## 2017-04-14 PROCEDURE — 84295 ASSAY OF SERUM SODIUM: CPT | Performed by: OTOLARYNGOLOGY

## 2017-04-14 PROCEDURE — 82803 BLOOD GASES ANY COMBINATION: CPT | Performed by: OTOLARYNGOLOGY

## 2017-04-14 PROCEDURE — 82947 ASSAY GLUCOSE BLOOD QUANT: CPT | Performed by: OTOLARYNGOLOGY

## 2017-04-14 PROCEDURE — 82330 ASSAY OF CALCIUM: CPT | Performed by: OTOLARYNGOLOGY

## 2017-04-14 PROCEDURE — 25000128 H RX IP 250 OP 636: Performed by: NURSE ANESTHETIST, CERTIFIED REGISTERED

## 2017-04-14 PROCEDURE — 27210794 ZZH OR GENERAL SUPPLY STERILE: Performed by: OTOLARYNGOLOGY

## 2017-04-14 PROCEDURE — 8E09XBG COMPUTER ASSISTED PROCEDURE OF HEAD AND NECK REGION, WITH COMPUTERIZED TOMOGRAPHY: ICD-10-PCS | Performed by: OTOLARYNGOLOGY

## 2017-04-14 PROCEDURE — 25000128 H RX IP 250 OP 636: Performed by: OTOLARYNGOLOGY

## 2017-04-14 PROCEDURE — 36000074 ZZH SURGERY LEVEL 6 1ST 30 MIN - UMMC: Performed by: OTOLARYNGOLOGY

## 2017-04-14 PROCEDURE — 71000014 ZZH RECOVERY PHASE 1 LEVEL 2 FIRST HR: Performed by: OTOLARYNGOLOGY

## 2017-04-14 PROCEDURE — 25800025 ZZH RX 258: Performed by: NURSE ANESTHETIST, CERTIFIED REGISTERED

## 2017-04-14 PROCEDURE — 12000003 ZZH R&B CRITICAL UMMC

## 2017-04-14 PROCEDURE — 84132 ASSAY OF SERUM POTASSIUM: CPT | Performed by: OTOLARYNGOLOGY

## 2017-04-14 PROCEDURE — 37000009 ZZH ANESTHESIA TECHNICAL FEE, EACH ADDTL 15 MIN: Performed by: OTOLARYNGOLOGY

## 2017-04-14 PROCEDURE — 40000257 ZZH STATISTIC CONSULT NO CHARGE VASC ACCESS

## 2017-04-14 PROCEDURE — 71000015 ZZH RECOVERY PHASE 1 LEVEL 2 EA ADDTL HR: Performed by: OTOLARYNGOLOGY

## 2017-04-14 PROCEDURE — 88341 IMHCHEM/IMCYTCHM EA ADD ANTB: CPT | Performed by: OTOLARYNGOLOGY

## 2017-04-14 PROCEDURE — 25000125 ZZHC RX 250: Performed by: ANESTHESIOLOGY

## 2017-04-14 DEVICE — GRAFT DUREPAIR DURA MATRIX 2X2" 62100: Type: IMPLANTABLE DEVICE | Site: BRAIN | Status: FUNCTIONAL

## 2017-04-14 RX ORDER — FENTANYL CITRATE 50 UG/ML
25-50 INJECTION, SOLUTION INTRAMUSCULAR; INTRAVENOUS EVERY 5 MIN PRN
Status: DISCONTINUED | OUTPATIENT
Start: 2017-04-14 | End: 2017-04-14 | Stop reason: HOSPADM

## 2017-04-14 RX ORDER — DEXAMETHASONE SODIUM PHOSPHATE 4 MG/ML
INJECTION, SOLUTION INTRA-ARTICULAR; INTRALESIONAL; INTRAMUSCULAR; INTRAVENOUS; SOFT TISSUE PRN
Status: DISCONTINUED | OUTPATIENT
Start: 2017-04-14 | End: 2017-04-14

## 2017-04-14 RX ORDER — LIDOCAINE HYDROCHLORIDE AND EPINEPHRINE 10; 10 MG/ML; UG/ML
INJECTION, SOLUTION INFILTRATION; PERINEURAL PRN
Status: DISCONTINUED | OUTPATIENT
Start: 2017-04-14 | End: 2017-04-14 | Stop reason: HOSPADM

## 2017-04-14 RX ORDER — LIDOCAINE HYDROCHLORIDE 20 MG/ML
INJECTION, SOLUTION INFILTRATION; PERINEURAL PRN
Status: DISCONTINUED | OUTPATIENT
Start: 2017-04-14 | End: 2017-04-14

## 2017-04-14 RX ORDER — NALOXONE HYDROCHLORIDE 0.4 MG/ML
.1-.4 INJECTION, SOLUTION INTRAMUSCULAR; INTRAVENOUS; SUBCUTANEOUS
Status: DISCONTINUED | OUTPATIENT
Start: 2017-04-14 | End: 2017-04-14 | Stop reason: HOSPADM

## 2017-04-14 RX ORDER — FENTANYL CITRATE 50 UG/ML
25-50 INJECTION, SOLUTION INTRAMUSCULAR; INTRAVENOUS
Status: DISCONTINUED | OUTPATIENT
Start: 2017-04-14 | End: 2017-04-14 | Stop reason: HOSPADM

## 2017-04-14 RX ORDER — NEOSTIGMINE METHYLSULFATE 1 MG/ML
VIAL (ML) INJECTION PRN
Status: DISCONTINUED | OUTPATIENT
Start: 2017-04-14 | End: 2017-04-14

## 2017-04-14 RX ORDER — GLYCOPYRROLATE 0.2 MG/ML
INJECTION, SOLUTION INTRAMUSCULAR; INTRAVENOUS PRN
Status: DISCONTINUED | OUTPATIENT
Start: 2017-04-14 | End: 2017-04-14

## 2017-04-14 RX ORDER — HYDROMORPHONE HYDROCHLORIDE 1 MG/ML
.3-.5 INJECTION, SOLUTION INTRAMUSCULAR; INTRAVENOUS; SUBCUTANEOUS EVERY 10 MIN PRN
Status: DISCONTINUED | OUTPATIENT
Start: 2017-04-14 | End: 2017-04-14 | Stop reason: HOSPADM

## 2017-04-14 RX ORDER — FENTANYL CITRATE 50 UG/ML
INJECTION, SOLUTION INTRAMUSCULAR; INTRAVENOUS PRN
Status: DISCONTINUED | OUTPATIENT
Start: 2017-04-14 | End: 2017-04-14

## 2017-04-14 RX ORDER — OXYMETAZOLINE HYDROCHLORIDE 0.05 G/100ML
SPRAY NASAL PRN
Status: DISCONTINUED | OUTPATIENT
Start: 2017-04-14 | End: 2017-04-14 | Stop reason: HOSPADM

## 2017-04-14 RX ORDER — ONDANSETRON 2 MG/ML
4 INJECTION INTRAMUSCULAR; INTRAVENOUS EVERY 30 MIN PRN
Status: DISCONTINUED | OUTPATIENT
Start: 2017-04-14 | End: 2017-04-14 | Stop reason: HOSPADM

## 2017-04-14 RX ORDER — SODIUM CHLORIDE, SODIUM LACTATE, POTASSIUM CHLORIDE, CALCIUM CHLORIDE 600; 310; 30; 20 MG/100ML; MG/100ML; MG/100ML; MG/100ML
INJECTION, SOLUTION INTRAVENOUS CONTINUOUS PRN
Status: DISCONTINUED | OUTPATIENT
Start: 2017-04-14 | End: 2017-04-14

## 2017-04-14 RX ORDER — PROPOFOL 10 MG/ML
INJECTION, EMULSION INTRAVENOUS PRN
Status: DISCONTINUED | OUTPATIENT
Start: 2017-04-14 | End: 2017-04-14

## 2017-04-14 RX ORDER — ONDANSETRON 2 MG/ML
INJECTION INTRAMUSCULAR; INTRAVENOUS PRN
Status: DISCONTINUED | OUTPATIENT
Start: 2017-04-14 | End: 2017-04-14

## 2017-04-14 RX ORDER — SODIUM CHLORIDE, SODIUM LACTATE, POTASSIUM CHLORIDE, CALCIUM CHLORIDE 600; 310; 30; 20 MG/100ML; MG/100ML; MG/100ML; MG/100ML
INJECTION, SOLUTION INTRAVENOUS CONTINUOUS
Status: DISCONTINUED | OUTPATIENT
Start: 2017-04-14 | End: 2017-04-14 | Stop reason: HOSPADM

## 2017-04-14 RX ORDER — ONDANSETRON 4 MG/1
4 TABLET, ORALLY DISINTEGRATING ORAL EVERY 30 MIN PRN
Status: DISCONTINUED | OUTPATIENT
Start: 2017-04-14 | End: 2017-04-14 | Stop reason: HOSPADM

## 2017-04-14 RX ORDER — MEPERIDINE HYDROCHLORIDE 25 MG/ML
12.5 INJECTION INTRAMUSCULAR; INTRAVENOUS; SUBCUTANEOUS
Status: DISCONTINUED | OUTPATIENT
Start: 2017-04-14 | End: 2017-04-14 | Stop reason: HOSPADM

## 2017-04-14 RX ADMIN — ACETAMINOPHEN 650 MG: 325 TABLET, FILM COATED ORAL at 08:16

## 2017-04-14 RX ADMIN — ROCURONIUM BROMIDE 10 MG: 10 INJECTION INTRAVENOUS at 15:45

## 2017-04-14 RX ADMIN — DEXAMETHASONE SODIUM PHOSPHATE 6 MG: 4 INJECTION, SOLUTION INTRA-ARTICULAR; INTRALESIONAL; INTRAMUSCULAR; INTRAVENOUS; SOFT TISSUE at 15:30

## 2017-04-14 RX ADMIN — MORPHINE SULFATE 0.5 MG: 2 INJECTION, SOLUTION INTRAMUSCULAR; INTRAVENOUS at 20:50

## 2017-04-14 RX ADMIN — ONDANSETRON 4 MG: 2 INJECTION INTRAMUSCULAR; INTRAVENOUS at 16:23

## 2017-04-14 RX ADMIN — GLYCOPYRROLATE 0.8 MG: 0.2 INJECTION, SOLUTION INTRAMUSCULAR; INTRAVENOUS at 16:25

## 2017-04-14 RX ADMIN — PROPOFOL 170 MG: 10 INJECTION, EMULSION INTRAVENOUS at 13:42

## 2017-04-14 RX ADMIN — ROCURONIUM BROMIDE 20 MG: 10 INJECTION INTRAVENOUS at 14:25

## 2017-04-14 RX ADMIN — OXYCODONE HYDROCHLORIDE 10 MG: 5 TABLET ORAL at 08:06

## 2017-04-14 RX ADMIN — MIDAZOLAM HYDROCHLORIDE 2 MG: 1 INJECTION, SOLUTION INTRAMUSCULAR; INTRAVENOUS at 13:29

## 2017-04-14 RX ADMIN — FENTANYL CITRATE 25 MCG: 50 INJECTION INTRAMUSCULAR; INTRAVENOUS at 18:40

## 2017-04-14 RX ADMIN — FENTANYL CITRATE 100 MCG: 50 INJECTION, SOLUTION INTRAMUSCULAR; INTRAVENOUS at 13:36

## 2017-04-14 RX ADMIN — DEXAMETHASONE SODIUM PHOSPHATE 4 MG: 4 INJECTION, SOLUTION INTRA-ARTICULAR; INTRALESIONAL; INTRAMUSCULAR; INTRAVENOUS; SOFT TISSUE at 14:12

## 2017-04-14 RX ADMIN — FENTANYL CITRATE 50 MCG: 50 INJECTION, SOLUTION INTRAMUSCULAR; INTRAVENOUS at 15:23

## 2017-04-14 RX ADMIN — MORPHINE SULFATE 0.5 MG: 2 INJECTION, SOLUTION INTRAMUSCULAR; INTRAVENOUS at 10:10

## 2017-04-14 RX ADMIN — OXYCODONE HYDROCHLORIDE 5 MG: 5 TABLET ORAL at 21:38

## 2017-04-14 RX ADMIN — HYDROMORPHONE HYDROCHLORIDE 0.2 MG: 10 INJECTION, SOLUTION INTRAMUSCULAR; INTRAVENOUS; SUBCUTANEOUS at 19:09

## 2017-04-14 RX ADMIN — ROCURONIUM BROMIDE 20 MG: 10 INJECTION INTRAVENOUS at 15:06

## 2017-04-14 RX ADMIN — PHENYLEPHRINE HYDROCHLORIDE 150 MCG: 10 INJECTION, SOLUTION INTRAMUSCULAR; INTRAVENOUS; SUBCUTANEOUS at 14:04

## 2017-04-14 RX ADMIN — Medication 5 MG: at 16:25

## 2017-04-14 RX ADMIN — SODIUM CHLORIDE, POTASSIUM CHLORIDE, SODIUM LACTATE AND CALCIUM CHLORIDE: 600; 310; 30; 20 INJECTION, SOLUTION INTRAVENOUS at 13:29

## 2017-04-14 RX ADMIN — ROCURONIUM BROMIDE 50 MG: 10 INJECTION INTRAVENOUS at 13:42

## 2017-04-14 RX ADMIN — SODIUM CHLORIDE, POTASSIUM CHLORIDE, SODIUM LACTATE AND CALCIUM CHLORIDE: 600; 310; 30; 20 INJECTION, SOLUTION INTRAVENOUS at 15:00

## 2017-04-14 RX ADMIN — SENNOSIDES AND DOCUSATE SODIUM 2 TABLET: 8.6; 5 TABLET ORAL at 08:06

## 2017-04-14 RX ADMIN — HYDROMORPHONE HYDROCHLORIDE 0.3 MG: 10 INJECTION, SOLUTION INTRAMUSCULAR; INTRAVENOUS; SUBCUTANEOUS at 18:40

## 2017-04-14 RX ADMIN — LIDOCAINE HYDROCHLORIDE 100 MG: 20 INJECTION, SOLUTION INFILTRATION; PERINEURAL at 13:42

## 2017-04-14 RX ADMIN — OXYCODONE HYDROCHLORIDE 5 MG: 5 TABLET ORAL at 23:05

## 2017-04-14 RX ADMIN — CEFTRIAXONE 2 G: 2 INJECTION, POWDER, FOR SOLUTION INTRAMUSCULAR; INTRAVENOUS at 12:52

## 2017-04-14 ASSESSMENT — VISUAL ACUITY
OU: NORMAL ACUITY

## 2017-04-14 NOTE — PLAN OF CARE
Problem: Goal Outcome Summary  Goal: Goal Outcome Summary  Outcome: No Change  Pt oriented, but lethargic and sleeping well. Able to make needs known. VSS, Neuros intact. NPO since midnight for OR today around noon. Per pt request last leobardo, pt wanted to take OR shower in am. Denies pain overnight. Voiding without difficulty. IVF infusing without difficulty. Scant if any nasal drainage overnight.  Plan: OR today

## 2017-04-14 NOTE — ANESTHESIA PREPROCEDURE EVALUATION
Anesthesia Evaluation     . Pt has had prior anesthetic.            ROS/MED HX    ENT/Pulmonary:     (+)CASSANDRA risk factors snores loudly, hypertension, obese, , . .    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 weeks)    (-) Spinal cord injury   Cardiovascular:         METS/Exercise Tolerance:     Hematologic:         Musculoskeletal:   (+) fracture upper extremity (Right Shoulder surgery), , -       GI/Hepatic:     (+) cholecystitis/cholelithiasis,       Renal/Genitourinary:         Endo:         Psychiatric:     (+) psychiatric history anxiety      Infectious Disease:         Malignancy:         Other:    (+) no H/O Chronic Pain,no other significant disability                    Physical Exam  Normal systems: cardiovascular and pulmonary    Airway   Mallampati: II  TM distance: >3 FB  Neck ROM: full    Dental     Cardiovascular   Rhythm and rate: regular and normal      Pulmonary                     Anesthesia Plan      History & Physical Review  History and physical reviewed and following examination; no interval change.    ASA Status:  2 .    NPO Status:  > 8 hours    Plan for General and ETT with Inhalation induction. Maintenance will be Balanced.    PONV prophylaxis:  Ondansetron (or other 5HT-3) and Dexamethasone or Solumedrol       Postoperative Care  Postoperative pain management:  IV analgesics and Multi-modal analgesia.      Consents  Anesthetic plan, risks, benefits and alternatives discussed with:  Patient.  Use of blood products discussed: Yes.   .                          .

## 2017-04-14 NOTE — PLAN OF CARE
Problem: Goal Outcome Summary  Goal: Goal Outcome Summary  Outcome: No Change  Pt transferred from  around 2130 for CSF leak. VSS. A&Ox4. Neuros intact ex occasional RUE numbness d/t degenerative disc disease. MIVF at 100ml/hr. Pt complaining of severe headache that is controlled with PRN IV morphine, oxy, and tylenol. Pt has constant CSF draining from R nostril. NPO at midnight for surgery tomorrow around [id number]. Pt would like to shower in the morning. Up  Independently. Uses call light appropriately. Will continue to monitor and follow POC.

## 2017-04-14 NOTE — PROGRESS NOTES
"SPIRITUAL HEALTH SERVICES  SPIRITUAL ASSESSMENT Progress Note  [facility] [place]) 6A     PRIMARY FOCUS:     Goals of care    Support for coping    ILLNESS CIRCUMSTANCES:   Reviewed documentation. Reflective conversation shared with Jayden which integrated elements of illness and family narratives.     Context of Serious Illness/Symptom(s) - Spinal Fluid leaking    Resources for Support - There was no family visiting patient when  went to see him, but the nurse was with him when I entered the room.    DISTRESS:     Emotional/Spiritual/Existential Distress - Distress from Sickness    [facility]ist Distress - Not discussed    Social/Cultural/Economic Distress - Not discussed    SPIRITUAL/Spiritism COPING:     Druze/Amaya - Patient is athiest    Spiritual Practice(s) - Offered words of comfort and encouragement    Emotional/Relational/Existential Connections - Patient was being seen by a nurse when  went to see him.    GOALS OF CARE:    Goals of Care - Not discussed    Meaning/Sense-Making - Patient said that, \" I wish that I felt better so that I can go home.\"  PLAN: May see patient again if he stays on my unit.    Kartik Romero   Intern  Pager [phone]  "

## 2017-04-14 NOTE — PROGRESS NOTES
"Neurosurgery Daily Progress Note  4/14/2017    Overnight events/subjective: no events overnight   O/ BP (!) 154/101 (BP Location: Right arm)  Pulse 84  Temp 97.6  F (36.4  C) (Oral)  Resp 16  Ht 1.803 m (5' 11\")  Wt 113.9 kg (251 lb 3.2 oz)  SpO2 97%  BMI 35.04 kg/m2  Exam:   Gen: Laying in bed, not in acute distress  MS: A&Ox3, Speech fluent and conversant   CN: Pupils round and reactive, extraocular movements intact, face symmetric, tongue midline, uvula & palate elevate symmetrically   Motor: 5/5 in b/l UE& LEs  Sensory: intact to light touch   No drift  Brisk clear fluid when patient leans forward     Non labored breathing    IMG:  MR orbit performed for operating planning by ENT     LABS: beta-2 transferrin positive; WBC remains normal      A/P: Khadar Weems is a 31 year old M w/ spontaneous CSF leak   -neurosurgery available to assist with lumbar drain placement intraoperatively today at noon    Tom Sierra MD   Neurosurgery PGY-3  Please contact the neurosurgery resident on call with questions by dialing * [phone], then entering [phone] when prompted        "

## 2017-04-14 NOTE — PROGRESS NOTES
Care Coordinator Progress Note     Admission Date/Time:  4/11/2017  Attending MD:  Emily Person MD     Data  Chart reviewed, discussed with interdisciplinary team.   Patient was admitted for: CSF leak.    Concerns with insurance coverage for discharge needs: None.  Current Living Situation: Patient lives with spouse.  Support System: Supportive  Services Involved: none  Transportation: Family or Friend will provide  Barriers to Discharge: medical plan of care     Assessment  Patient is scheduled for surgery today. He is currently getting up independent in his room. Anticipate he will have no needs at discharge, RNCC will continue to follow to see if needs arise.       Plan  Anticipated Discharge Date:  .TBD  Anticipated Discharge Plan:  Home with follow up    Sharon Malhotra RN, BSN, PHN  RNCC  PH: [phone]  Pager: [phone]

## 2017-04-14 NOTE — ANESTHESIA CARE TRANSFER NOTE
Patient: Khadar Weems    Procedure(s):  Stealth Assisted Endoscopic Endonasal Cerebrospinal Fluid (CSF) Leak Repair, lumbar drain placement - Wound Class: II-Clean Contaminated    Diagnosis: Cerebrospinal Fluid (CSF) Leak  Diagnosis Additional Information: No value filed.    Anesthesia Type:   General, ETT     Note:  Airway :Face Mask  Patient transferred to:PACU  Comments: VSS. Ventilating well. See quick note by previous CRNA re: BP cuff injury.       Vitals: (Last set prior to Anesthesia Care Transfer)    CRNA VITALS  4/14/2017 1651 - 4/14/2017 1737      4/14/2017             Resp Rate (set): 10                Electronically Signed By: DALE Alaniz CRNA  April 14, 2017  5:37 PM

## 2017-04-14 NOTE — PLAN OF CARE
Problem: Goal Outcome Summary  Goal: Goal Outcome Summary  Outcome: No Change  D/I:Neurs intact. Up ad yasir. Draining from R nostril still.Getting Morphine. oxy and tylenol for HA. Chlorhexidine shower done.Pre-op checklist done.  P: Instructed on LD post op and what that entails.OR is on the way to get him.    Addendum: Father has backpack,phone and .

## 2017-04-14 NOTE — BRIEF OP NOTE
[facility], [place]    Brief Operative Note    Pre-operative diagnosis: Cerebrospinal Fluid (CSF) Leak  Post-operative diagnosis Cerebrospinal Fluid (CSF) Leak  Procedure: Procedure(s):  Stealth Assisted Endoscopic Endonasal Cerebrospinal Fluid (CSF) Leak Repair, lumbar drain placement - Wound Class: II-Clean Contaminated  Surgeon: Surgeon(s) and Role:     * Emily Person MD - Primary     * Case Chavarria MD - Resident - Assisting     * Genevieve Lee MD - Resident - Assisting  Anesthesia: General   Estimated blood loss: Less than 10 ml  Drains: Lumbar Drain  Specimens:   ID Type Source Tests Collected by Time Destination   A : Sphenoid Sinus Lesion Tissue Other SURGICAL PATHOLOGY EXAM Emily Person MD 4/14/2017  2:36 PM      Findings:   Defect in posterior wall of right sphenoid sinus with active CSF leak, repaired with nasoseptal flap.  Complications: None.  Implants: None.      - Patient has a merocel in his right nose. This is to stay in place for 5 days. Also has patel splints in b/l nasal passage, sutured in place. Splints will be removed in 3 weeks.  - Starting keflex while merocel in place  - He inadvertently had an inflated BP cuff on his right arm throughout the case. It was removed postoperatively. The patient had a good radial pulse and good capillary refill. In the PACU, he had full range of motion of all digits and 5/5 strength. Did report a slight sensation of numbness over the forearm and hand. Nursing is aware and will monitor for signs of compartment syndrome.     Janina Chavarria MD  Otolaryngology Resident

## 2017-04-15 LAB — GLUCOSE BLDC GLUCOMTR-MCNC: 134 MG/DL (ref 70–99)

## 2017-04-15 PROCEDURE — 00000146 ZZHCL STATISTIC GLUCOSE BY METER IP

## 2017-04-15 PROCEDURE — 25000132 ZZH RX MED GY IP 250 OP 250 PS 637: Performed by: STUDENT IN AN ORGANIZED HEALTH CARE EDUCATION/TRAINING PROGRAM

## 2017-04-15 PROCEDURE — 25000128 H RX IP 250 OP 636: Performed by: OTOLARYNGOLOGY

## 2017-04-15 PROCEDURE — 25000132 ZZH RX MED GY IP 250 OP 250 PS 637: Performed by: OTOLARYNGOLOGY

## 2017-04-15 PROCEDURE — 12000003 ZZH R&B CRITICAL UMMC

## 2017-04-15 RX ORDER — MORPHINE SULFATE 2 MG/ML
1 INJECTION, SOLUTION INTRAMUSCULAR; INTRAVENOUS EVERY 4 HOURS PRN
Status: DISCONTINUED | OUTPATIENT
Start: 2017-04-15 | End: 2017-04-17

## 2017-04-15 RX ORDER — OXYCODONE HYDROCHLORIDE 5 MG/1
5-10 TABLET ORAL
Status: DISCONTINUED | OUTPATIENT
Start: 2017-04-15 | End: 2017-04-19 | Stop reason: HOSPADM

## 2017-04-15 RX ORDER — ACETAMINOPHEN 325 MG/1
650 TABLET ORAL EVERY 4 HOURS
Status: DISCONTINUED | OUTPATIENT
Start: 2017-04-15 | End: 2017-04-19 | Stop reason: HOSPADM

## 2017-04-15 RX ADMIN — NICOTINE 1 PATCH: 7 PATCH, EXTENDED RELEASE TRANSDERMAL at 03:07

## 2017-04-15 RX ADMIN — OXYCODONE HYDROCHLORIDE 10 MG: 5 TABLET ORAL at 12:05

## 2017-04-15 RX ADMIN — CEPHALEXIN 250 MG: 250 CAPSULE ORAL at 23:45

## 2017-04-15 RX ADMIN — ACETAMINOPHEN 650 MG: 325 TABLET, FILM COATED ORAL at 23:45

## 2017-04-15 RX ADMIN — CEPHALEXIN 250 MG: 250 CAPSULE ORAL at 12:05

## 2017-04-15 RX ADMIN — MORPHINE SULFATE 0.5 MG: 2 INJECTION, SOLUTION INTRAMUSCULAR; INTRAVENOUS at 14:31

## 2017-04-15 RX ADMIN — SENNOSIDES AND DOCUSATE SODIUM 2 TABLET: 8.6; 5 TABLET ORAL at 19:41

## 2017-04-15 RX ADMIN — MORPHINE SULFATE 1 MG: 2 INJECTION, SOLUTION INTRAMUSCULAR; INTRAVENOUS at 22:01

## 2017-04-15 RX ADMIN — ACETAMINOPHEN 650 MG: 325 TABLET, FILM COATED ORAL at 19:58

## 2017-04-15 RX ADMIN — CEPHALEXIN 250 MG: 250 CAPSULE ORAL at 18:10

## 2017-04-15 RX ADMIN — OXYCODONE HYDROCHLORIDE 10 MG: 5 TABLET ORAL at 16:34

## 2017-04-15 RX ADMIN — OXYCODONE HYDROCHLORIDE 10 MG: 5 TABLET ORAL at 19:42

## 2017-04-15 RX ADMIN — MORPHINE SULFATE 1 MG: 2 INJECTION, SOLUTION INTRAMUSCULAR; INTRAVENOUS at 18:06

## 2017-04-15 RX ADMIN — CEPHALEXIN 250 MG: 250 CAPSULE ORAL at 00:52

## 2017-04-15 RX ADMIN — SENNOSIDES AND DOCUSATE SODIUM 2 TABLET: 8.6; 5 TABLET ORAL at 07:55

## 2017-04-15 RX ADMIN — ACETAMINOPHEN 650 MG: 325 TABLET, FILM COATED ORAL at 16:41

## 2017-04-15 RX ADMIN — OXYCODONE HYDROCHLORIDE 10 MG: 5 TABLET ORAL at 07:55

## 2017-04-15 RX ADMIN — MORPHINE SULFATE 0.5 MG: 2 INJECTION, SOLUTION INTRAMUSCULAR; INTRAVENOUS at 10:02

## 2017-04-15 RX ADMIN — MORPHINE SULFATE 0.5 MG: 2 INJECTION, SOLUTION INTRAMUSCULAR; INTRAVENOUS at 01:01

## 2017-04-15 RX ADMIN — OXYCODONE HYDROCHLORIDE 10 MG: 5 TABLET ORAL at 23:18

## 2017-04-15 RX ADMIN — BISACODYL 5 MG: 5 TABLET, COATED ORAL at 19:58

## 2017-04-15 RX ADMIN — ACETAMINOPHEN 650 MG: 325 TABLET, FILM COATED ORAL at 00:53

## 2017-04-15 RX ADMIN — CEPHALEXIN 250 MG: 250 CAPSULE ORAL at 05:47

## 2017-04-15 RX ADMIN — OXYCODONE HYDROCHLORIDE 5 MG: 5 TABLET ORAL at 03:06

## 2017-04-15 ASSESSMENT — VISUAL ACUITY
OU: NORMAL ACUITY

## 2017-04-15 NOTE — OR NURSING
Dr. Julio updated that pt's diastolic BP still  mm Hg - okay to send to 6A.  Dr. Julio to enter anesthesia sign-out.

## 2017-04-15 NOTE — PLAN OF CARE
Problem: Goal Outcome Summary  Goal: Goal Outcome Summary  Outcome: Improving  POD 1 S/P LD placement for Spontaneous CSF leak. A&Ox4, VSS. Capnography discontinued DT nasal packing. Neuros include: RUE tingling in palm and fingers. With +pulses and 5/5 strength. IV SL. Reg diet and tolerating well. Voiding spontaneously. No Bm. LD open and at shoulder level. Ok'd to be at shoulder level by Dr. Genevieve Coto because drain not draining above shoulder as written in order. Putting out 1-10 ml/hr.  Site is CDI, no leaking or draining at site. No leaking reported by patient. Up with SBA and GB. Ambulated in hallway with RN x1. CO HA, Gave PRN morphine and Oxycodone. Plan to clamp drain tmw if no leaking. Continue with POC.

## 2017-04-15 NOTE — OR NURSING
Dr. Julio, pt's family at bedside.  Dr. Julio explained procedure to patient, family, and blood pressure cuff issue in OR.  Pt has tingling in right fingertips and right palm.  Also, Dr. Julio notified of pt's elevated blood pressure - pt switched to more appropriate sized cuff.

## 2017-04-15 NOTE — PROGRESS NOTES
"Neurosurgery Daily Progress Note  4/15/2017    Overnight events/subjective: no complains of draining from the nose; patient has not yet been ambulating much or learning forward to accurately assess for leakage     O/ /71 (BP Location: Right arm)  Pulse 106  Temp 96.4  F (35.8  C) (Oral)  Resp 16  Ht 1.803 m (5' 11\")  Wt 113.9 kg (251 lb 3.2 oz)  SpO2 93%  BMI 35.04 kg/m2    Exam:   Gen: Laying in bed, not in acute distress  MS: A&Ox3, Speech fluent and conversant   CN: Pupils round and reactive, extraocular movements intact, face symmetric, tongue midline, uvula & palate elevate symmetrically   Motor: 5/5 in b/l UE& LEs  Sensory: intact to light touch   No drift  No draining from nose when laying back in bed    Non labored breathing    IMG:  MR orbit performed for operating planning by ENT     LABS: beta-2 transferrin positive; AM labs pending     LD output: 10cc yesterday + 34 cc this AM    A/P: Khadar Weems is a 31 year old M w/ spontaneous CSF leak s/p endonasal repair by ENT, and lumbar drain placement yesterday.   -LD remains at 10 above shoulder; if signs of CSF leak will drop LD, otherwise plan to clamp tomorrow     Tom Sierra MD   Neurosurgery PGY-3  Please contact the neurosurgery resident on call with questions by dialing * [phone], then entering [phone] when prompted  "

## 2017-04-15 NOTE — PROGRESS NOTES
"Neurosurgery Daily Progress Note  4/15/2017    Overnight events/subjective: Underwent surgical repair overnight, lumbar drain placement. Some residual numbness/tingling in R arm due to BP cuff inflated for 2> hrs during case yesterday.     O/ /83 (BP Location: Left arm)  Pulse 106  Temp 98  F (36.7  C) (Oral)  Resp 16  Ht 1.803 m (5' 11\")  Wt 113.9 kg (251 lb 3.2 oz)  SpO2 98%  BMI 35.04 kg/m2  Exam:   Gen: Laying in bed, not in acute distress  MS: A&Ox3, Speech fluent and conversant   CN: Pupils round and reactive, extraocular movements intact, face symmetric, tongue midline, uvula & palate elevate symmetrically   Motor: 5/5 in b/l UE& LEs  Sensory: complains of numbness/tingling and occasional pains in R arm   No drift    Non labored breathing    IMG: no new imaging     LABS: per primary team      A/P: Khadar Weems is a 31 year old POD#1 s/p repair of posterior wall of sphenoid sinus for rhinorrhea. Neurosurgery assisted with placement of lumbar drain.   -drain currently open @ 10 above shoulder; will continue to titrate according signs/symptoms of persistent CSF leak   -please page neurosurgery on-call resident for any issues related to lumbar drain or if rhinorrhea persist    Tom Sierra MD   Neurosurgery PGY-3  Please contact the neurosurgery resident on call with questions by dialing * * [phone], then entering [phone] when prompted        "

## 2017-04-15 NOTE — ADDENDUM NOTE
Addendum  created 04/15/17 0954 by Valdez Julio MD    Flowsheet data filed from Active Guidelines, Sign clinical note

## 2017-04-15 NOTE — PROGRESS NOTES
ENT Brief Note    Patient evaluated for intermittent right arm pain.     Patient reports that pain is better. No increased pain with movement of muscles. There is still some mild numbness/tingling.     Exam:  RUE with full range of motion and strength. Compartments soft. Fingers warm, perfused, with good cap refill    A/P: POD#1 s/p transsphenoidal repair of CSF leak. BP cuff inflated on right arm throughout procedure. No definite signs of compartment syndrome at this time. Will continue to monitor.     Staci Crow MD  Otolaryngology- Head and Neck Surgery PGY2

## 2017-04-15 NOTE — PLAN OF CARE
Problem: Goal Outcome Summary  Goal: Goal Outcome Summary  Outcome: No Change  Transfer from PACU at 2000. POD #0 LD placement from spontaneously CSF leak. HTN within parameters and tachy at times. 2L O2 via NC with sats in mid 90 s. Capnography IPI 8-9 and ETCO2 low 40's this shift. Neuro unchanged: RUE tingling with +pulses, warm, and 5/5 strength. Pt c/o HA controlled with PRN IV morphine and PO oxy this shift. Voiding spontaneously; 2 PVR's under 100 mL. No BM this shift. Regular diet; fair intake. LD site CDI with output ranging from 0-3cc this shift; MD aware. Nasal packing in place; no drainage. Continue to monitor and follow current POC.

## 2017-04-15 NOTE — PLAN OF CARE
Problem: Goal Outcome Summary  Goal: Goal Outcome Summary  Outcome: Improving  POD #1 LD placement from spontaneously CSF leak. HTN within parameters and tachy at times. 2-3 L O2 via NC with sats in mid 90 s. Capnography IPI 8-9 and ETCO2 low 40's this shift. Neuros unchanged: RUE tingling with +pulses, intermittent sharp pain, warm, and 5/5 strength. Pt c/o HA controlled with PRN IV morphine, Tylenol and PO oxycodone. Voiding spontaneously. No BM this shift. Regular diet; good intake. LD site CDI; clear drainage 1-11cc this shift. Nasal packing in place; no drainage. Continue to monitor and follow current POC.

## 2017-04-15 NOTE — ANESTHESIA POSTPROCEDURE EVALUATION
Patient: Khadar Weems    Procedure(s):  Stealth Assisted Endoscopic Endonasal Cerebrospinal Fluid (CSF) Leak Repair, lumbar drain placement - Wound Class: II-Clean Contaminated    Diagnosis:Cerebrospinal Fluid (CSF) Leak  Diagnosis Additional Information: No value filed.    Anesthesia Type:  General, ETT    Note:  Anesthesia Post Evaluation    Patient location during evaluation: PACU  Patient participation: Able to fully participate in evaluation  Level of consciousness: awake and alert  Pain management: adequate  Airway patency: patent  Cardiovascular status: acceptable and hemodynamically stable  Respiratory status: acceptable and spontaneous ventilation  Hydration status: acceptable  PONV: none     Anesthetic complications: yes  Additional complication comments:Other QI - See Comment  Comments: See QNote        Last vitals:  Vitals:    04/14/17 2025 04/14/17 2056 04/14/17 2130   BP: 153/86 167/84 159/90   Pulse:      Resp: 16 18 16   Temp: 36.6  C (97.9  F)     SpO2: 94% 94% 95%         Electronically Signed By: Valdez Julio MD  April 14, 2017  10:45 PM

## 2017-04-15 NOTE — OR NURSING
Pt's BPs continue to be elevated after cuff repositioned and pt given pain meds for headache - Dr. Julio notified, pt given 5mg IV labetalol.

## 2017-04-15 NOTE — PROVIDER NOTIFICATION
Pt stated that right arm has started to have an intermittent sharp shooting pain and continue to have constant tingling in RUE and numbness in fingers, CMS still intact, and pain per pt is a little worse.  with ENT updated and will check pt with rounds. Will continue to monitor closely.

## 2017-04-15 NOTE — PROGRESS NOTES
"Overnight events/subjective: no complains of draining from the nose;    POD #1  Neuros unchanged.   Patient feels that he's improving  O/ /71 (BP Location: Right arm)  Pulse 106  Temp 96.4  F (35.8  C) (Oral)  Resp 16  Ht 1.803 m (5' 11\")  Wt 113.9 kg (251 lb 3.2 oz)  SpO2 93%  BMI 35.04 kg/m2     Exam:   Gen: Laying in bed, and relaxing  MS: A&Ox3,conversant   Motor: 5/5 in b/l UE & LEs  RUE tingling with +pulses, and 5/5 strength.  Sensory: intact to light touch,warm sensation and occasional sharp pain,   No definite signs of compartment syndrome at this time.   Will continue to monitor.       Quang Dueñas MD  Anesthesiologist    "

## 2017-04-15 NOTE — PROGRESS NOTES
Event note:  I was notified that upon removal of the drapes, the right armed was noted to be cyanotic and BP cuff in the upper arm was partially inflated with proximal tubing kinked and obstructing arm perfusion. BP cuffed was removed and the arm perfused and the color recovered. Arterial pulses at radial and ulnar were palpable/norml and the skin color was pink.      Of note, during resuming care for the patient, I was notified that there was a problem with the BP measurements with no measurements obtained during bed positioning at the beginning of the case. The cuff was repositioned and BP was normal using Lt arm BP cuff. The no measurement period was attributed to BP cuff malfunction.     The patient emerged from anesthesia and extubated without difficulty. In PACU, he had good motor function in both arms. Initially, he complained about some tingling in the tips of the rt hand finger upon asking, but not consistent. At the end of PACU stay, the tingling persisted in the rt thumb and inside of rt forearm. No swelling or edema.  Vascular surgery called for consultation and future monitoring and management and he suggested to monitor for motor, sensory deficits and pain in case of development of compartment syndrome.    The anesthesia team discussed and explained to family (mother and step father) the unanticipated complication and that the patient will be followed closely in the next few days.    Most likely explanation is that while the BP cuff was working well during the induction of anesthesia, the tubing was kinked during inflation and was trapped under the drapes or during tacking and the air did not escape from the cuff. At that point BP malfunction was attributed to the defective cuff. Time of the cuff being partially inflated about 2 and 1/2 hrs. The anesthesia team involved during the time of the event was informed about the problem and the patient will be f/u closely.    Valdez Julio MD

## 2017-04-15 NOTE — PROGRESS NOTES
"Otolaryngology Daily Progress Note  4/15/2017    S:  No drainage from the nose or down the back of the throat. No salty or metallic taste. Right arm is still tingly but is feeling better.     Objective:  /71 (BP Location: Right arm)  Pulse 106  Temp 96.4  F (35.8  C) (Oral)  Resp 16  Ht 1.803 m (5' 11\")  Wt 113.9 kg (251 lb 3.2 oz)  SpO2 93%  BMI 35.04 kg/m2   General: Alert and oriented x 3, No acute distress   HEENT: EOMI. PERRL, Nasal packing in place. No drainage. No drainage in the oropharynx.     Pulmonary: Breathing non-labored, no stridor, no accessory muscle use.   Extremities: right arm soft, warm, pink, sensation intact and fingers mobile      Intake/Output Summary (Last 24 hours) at 04/15/17 1046  Last data filed at 04/15/17 0900   Gross per 24 hour   Intake             3760 ml   Output             2654 ml   Net             1106 ml       LABS:  ROUTINE IP LABS (Last four results)  BMP    Recent Labs  Lab 04/14/17  1710 04/11/17  1804    139   POTASSIUM 4.5 3.9   CHLORIDE  --  103   RAZA  --  9.3   CO2  --  28   BUN  --  10   CR  --  0.82   * 81     CBC    Recent Labs  Lab 04/14/17  1710 04/13/17  0742 04/12/17  0736 04/11/17  1804   WBC  --  11.5* 11.0 9.7   RBC  --  4.62 4.77 5.08   HGB 14.1 13.7 14.2 15.0   HCT  --  41.2 42.0 44.3   MCV  --  89 88 87   MCH  --  29.7 29.8 29.5   MCHC  --  33.3 33.8 33.9   RDW  --  13.5 13.5 13.0   PLT  --  330 324 415     INR    Recent Labs  Lab 04/11/17  1840   INR 1.08       Imaging:  CT 4/11/17:  Defects in the posterior wall of the sphenoid sinus  compatible with provided history of suspected CSF leak. Associated near complete opacification of the right sphenoid locule.    MRI 4/15/17:  1. T1 and T2 isointense soft tissue lesion centered in the enlarged right posterior sphenoid sinus locule measuring 1.1 x 1.1 x 1.3 cm, without associated contrast enhancement or restricted diffusion. This could represent a mucocele in a loculation of the " posterior right sphenoid locule with resultant bony erosion. Given the lack of enhancement neoplastic considerations are considered less likely.  2. T2 hyperintense signal seen within the enlarged right sphenoid locule could represent trapped secretions or CSF. Correlation with beta-transferrin testing is recommended.    Assessment/Plan:  Mr. 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 POD 1 s/p endoscopic repair of sphenoid sinus dehiscence with nasoseptal flap. Doyles and merocels in place LD in place and open at the shoulder.   - Merocels in place x 5 days  - Harrington's in place for 3 weeks  - LD per neurosurgery  - will continue to monitor right arm for signs of compartment syndrome    Patient and plan will be discussed with Dr. Raza Castellano MD  Otolaryngology Resident  Please contact ENT with questions by dialing * [phone] and entering job code [phone] when prompted

## 2017-04-16 LAB — GLUCOSE BLDC GLUCOMTR-MCNC: 90 MG/DL (ref 70–99)

## 2017-04-16 PROCEDURE — 25000132 ZZH RX MED GY IP 250 OP 250 PS 637: Performed by: STUDENT IN AN ORGANIZED HEALTH CARE EDUCATION/TRAINING PROGRAM

## 2017-04-16 PROCEDURE — 12000008 ZZH R&B INTERMEDIATE UMMC

## 2017-04-16 PROCEDURE — 25000132 ZZH RX MED GY IP 250 OP 250 PS 637: Performed by: OTOLARYNGOLOGY

## 2017-04-16 PROCEDURE — 00000146 ZZHCL STATISTIC GLUCOSE BY METER IP

## 2017-04-16 PROCEDURE — 25000128 H RX IP 250 OP 636: Performed by: OTOLARYNGOLOGY

## 2017-04-16 RX ADMIN — CEPHALEXIN 250 MG: 250 CAPSULE ORAL at 18:18

## 2017-04-16 RX ADMIN — POLYETHYLENE GLYCOL 3350 17 G: 17 POWDER, FOR SOLUTION ORAL at 11:58

## 2017-04-16 RX ADMIN — ACETAMINOPHEN 650 MG: 325 TABLET, FILM COATED ORAL at 09:51

## 2017-04-16 RX ADMIN — OXYCODONE HYDROCHLORIDE 10 MG: 5 TABLET ORAL at 03:09

## 2017-04-16 RX ADMIN — OXYCODONE HYDROCHLORIDE 10 MG: 5 TABLET ORAL at 17:30

## 2017-04-16 RX ADMIN — MORPHINE SULFATE 1 MG: 2 INJECTION, SOLUTION INTRAMUSCULAR; INTRAVENOUS at 15:54

## 2017-04-16 RX ADMIN — MORPHINE SULFATE 1 MG: 2 INJECTION, SOLUTION INTRAMUSCULAR; INTRAVENOUS at 22:40

## 2017-04-16 RX ADMIN — ACETAMINOPHEN 650 MG: 325 TABLET, FILM COATED ORAL at 06:09

## 2017-04-16 RX ADMIN — MORPHINE SULFATE 1 MG: 2 INJECTION, SOLUTION INTRAMUSCULAR; INTRAVENOUS at 07:54

## 2017-04-16 RX ADMIN — OXYCODONE HYDROCHLORIDE 10 MG: 5 TABLET ORAL at 20:25

## 2017-04-16 RX ADMIN — SENNOSIDES AND DOCUSATE SODIUM 2 TABLET: 8.6; 5 TABLET ORAL at 19:22

## 2017-04-16 RX ADMIN — MORPHINE SULFATE 1 MG: 2 INJECTION, SOLUTION INTRAMUSCULAR; INTRAVENOUS at 11:54

## 2017-04-16 RX ADMIN — NICOTINE 1 PATCH: 7 PATCH, EXTENDED RELEASE TRANSDERMAL at 07:54

## 2017-04-16 RX ADMIN — CEPHALEXIN 250 MG: 250 CAPSULE ORAL at 06:09

## 2017-04-16 RX ADMIN — OXYCODONE HYDROCHLORIDE 10 MG: 5 TABLET ORAL at 12:52

## 2017-04-16 RX ADMIN — ACETAMINOPHEN 650 MG: 325 TABLET, FILM COATED ORAL at 18:18

## 2017-04-16 RX ADMIN — BISACODYL 10 MG: 5 TABLET, COATED ORAL at 07:58

## 2017-04-16 RX ADMIN — CEPHALEXIN 250 MG: 250 CAPSULE ORAL at 12:52

## 2017-04-16 RX ADMIN — ACETAMINOPHEN 650 MG: 325 TABLET, FILM COATED ORAL at 22:34

## 2017-04-16 RX ADMIN — OXYCODONE HYDROCHLORIDE 10 MG: 5 TABLET ORAL at 06:10

## 2017-04-16 RX ADMIN — OXYCODONE HYDROCHLORIDE 10 MG: 5 TABLET ORAL at 09:52

## 2017-04-16 RX ADMIN — MORPHINE SULFATE 1 MG: 2 INJECTION, SOLUTION INTRAMUSCULAR; INTRAVENOUS at 02:10

## 2017-04-16 RX ADMIN — ACETAMINOPHEN 650 MG: 325 TABLET, FILM COATED ORAL at 13:50

## 2017-04-16 RX ADMIN — SENNOSIDES AND DOCUSATE SODIUM 2 TABLET: 8.6; 5 TABLET ORAL at 07:58

## 2017-04-16 ASSESSMENT — VISUAL ACUITY
OU: NORMAL ACUITY

## 2017-04-16 NOTE — PLAN OF CARE
Problem: Goal Outcome Summary  Goal: Goal Outcome Summary  Outcome: No Change  POD #2 LD placement from spontaneously CSF leak. HTN within parameters and tachy at times. 1.5 L O2 via mask with sats in mid 90 s. Neuros unchanged: RUE tingling with +pulses, warm, and 5/5 strength. Pt c/o HA controlled with PRN IV morphine, Tylenol and PO oxycodone. Voiding spontaneously. No BM this shift; None since LD placement - had Dulcolax prn yesterday evening but no result. Regular diet; good intake. LD site CDI; clear drainage 0-1cc this shift - Dr. Hernandez is aware - plan is to clamp drain today. Nasal packing in place; with small amount of bloody red drainage out of right nare - Dr. Lee is aware and saw pt right before night shift. Pt denies metallic taste. Continue to monitor and follow current POC.

## 2017-04-16 NOTE — PLAN OF CARE
Problem: Goal Outcome Summary  Goal: Goal Outcome Summary  Outcome: No Change  POD #2 LD placement from spontaneously CSF leak. VSS. Neuro unchanged: RUE numb and tingly (improving) with +pulses, warm, and strength 5/5. Pt c/o HA controlled with PRN IV morphine and PO oxy this shift, also scheduled Tylenol. Voiding spontaneously. No BM this shift, +flatus; PRN PO dulcolax and miralax given. Regular diet; good intake. LD site CDI, clamped. No signs/symptoms of CSF leak. Nasal packing in place, but coming out of nare; scant bloody drainage from R nare; MD notified. Up ind./SBA in room. Walked halls x1. Continue to monitor and follow current POC.

## 2017-04-16 NOTE — PROGRESS NOTES
"Neurosurgery Daily Progress Note  4/16/2017    Overnight events/subjective: No drainage from nose. Ambulated yesterday around the floor and to the bathroom, no leakage noted. Did have a mild headache, relieved with pain medications.     O/ /83 (BP Location: Left arm)  Pulse 70  Temp 97.5  F (36.4  C) (Oral)  Resp 16  Ht 1.803 m (5' 11\")  Wt 113.9 kg (251 lb 3.2 oz)  SpO2 94%  BMI 35.04 kg/m2    Exam:   Gen: Laying in bed, not in acute distress  MS: A&Ox3, Speech fluent and conversant   CN: Pupils round and reactive, extraocular movements intact, face symmetric, tongue midline, uvula & palate elevate symmetrically   Motor: 5/5 in b/l UE& LEs  Sensory: intact to light touch   No drift  No draining from nose when laying back in bed    Non labored breathing    LABS: pending    LD output: minimal     A/P: Khadar Weems is a 31 year old M w/ spontaneous CSF leak s/p endonasal repair by ENT, and lumbar drain placement.  -LD clamped this morning   -Will leave LD in place per discretion of ENT primary team     Genevieve Lee MD   Neurosurgery PGY-2  Please contact the neurosurgery resident on call with questions by dialing * [phone], then entering [phone] when prompted  "

## 2017-04-16 NOTE — PROVIDER NOTIFICATION
Noted that pt's right nare has dried red blood and string for packing is sticking out by an inch; Dr. Scott updated by paging by charge Ines Abad RN. Will continue to monitor.

## 2017-04-16 NOTE — PLAN OF CARE
Problem: Goal Outcome Summary  Goal: Goal Outcome Summary  Outcome: No Change  POD #1 LD placement from spontaneously CSF leak. VSS. Neuro unchanged: RUE numb and tingly with +pulses, warm, and strength 5/5. Pt c/o HA partially controlled with PRN IV morphine and PO oxy this shift. Voiding spontaneously. No BM this shift; PRN PO dulcolax given. Regular diet; good intake. LD site CDI with output ranging from 0-2cc this shift; MD aware. LD at 10 cm above shoulder. No signs/symptoms of CSF leak. Nasal packing in place; scant bloody drainage from R nare; MD notified. Likely clamp LD tomorrow. Continue to monitor and follow current POC.

## 2017-04-16 NOTE — PROGRESS NOTES
"Otolaryngology Daily Progress Note  4/16/2017    S:  No acute events overnight. No drainage from the nose or down the back of the throat. No salty or metallic taste. Right arm is still tingly but is feeling better.     Objective:  /83 (BP Location: Left arm)  Pulse 70  Temp 97.5  F (36.4  C) (Oral)  Resp 16  Ht 1.803 m (5' 11\")  Wt 113.9 kg (251 lb 3.2 oz)  SpO2 94%  BMI 35.04 kg/m2   General: Alert and oriented x 3, No acute distress   HEENT: EOMI. PERRL, Nasal packing in place. No drainage. No drainage in the oropharynx.     Pulmonary: Breathing non-labored, no stridor, no accessory muscle use.   Extremities: right arm soft, warm, pink, sensation intact and fingers mobile        Intake/Output Summary (Last 24 hours) at 04/16/17 1045  Last data filed at 04/16/17 1000   Gross per 24 hour   Intake          5291.67 ml   Output             1422 ml   Net          3869.67 ml       LABS:  ROUTINE IP LABS (Last four results)  BMP    Recent Labs  Lab 04/14/17  1710 04/11/17  1804    139   POTASSIUM 4.5 3.9   CHLORIDE  --  103   RAZA  --  9.3   CO2  --  28   BUN  --  10   CR  --  0.82   * 81     CBC    Recent Labs  Lab 04/14/17  1710 04/13/17  0742 04/12/17  0736 04/11/17  1804   WBC  --  11.5* 11.0 9.7   RBC  --  4.62 4.77 5.08   HGB 14.1 13.7 14.2 15.0   HCT  --  41.2 42.0 44.3   MCV  --  89 88 87   MCH  --  29.7 29.8 29.5   MCHC  --  33.3 33.8 33.9   RDW  --  13.5 13.5 13.0   PLT  --  330 324 415     INR    Recent Labs  Lab 04/11/17  1840   INR 1.08       Imaging:  CT 4/11/17:  Defects in the posterior wall of the sphenoid sinus  compatible with provided history of suspected CSF leak. Associated near complete opacification of the right sphenoid locule.    MRI 4/15/17:  1. T1 and T2 isointense soft tissue lesion centered in the enlarged right posterior sphenoid sinus locule measuring 1.1 x 1.1 x 1.3 cm, without associated contrast enhancement or restricted diffusion. This could represent a mucocele " in a loculation of the posterior right sphenoid locule with resultant bony erosion. Given the lack of enhancement neoplastic considerations are considered less likely.  2. T2 hyperintense signal seen within the enlarged right sphenoid locule could represent trapped secretions or CSF. Correlation with beta-transferrin testing is recommended.    Assessment/Plan:  Mr. 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 POD 2 s/p endoscopic repair of sphenoid sinus dehiscence with nasoseptal flap. Doyles and merocels in place.  - Merocels in place x 5 days  - Harrington's in place for 3 weeks  - LD per neurosurgery, clamped this am  - will continue to monitor right arm for signs of compartment syndrome    Patient and plan will be discussed with Dr. Raza Castellano MD  Otolaryngology Resident  Please contact ENT with questions by dialing * [phone] and entering job code [phone] when prompted

## 2017-04-17 PROCEDURE — 25000132 ZZH RX MED GY IP 250 OP 250 PS 637: Performed by: OTOLARYNGOLOGY

## 2017-04-17 PROCEDURE — 25000128 H RX IP 250 OP 636: Performed by: OTOLARYNGOLOGY

## 2017-04-17 PROCEDURE — 12000001 ZZH R&B MED SURG/OB UMMC

## 2017-04-17 PROCEDURE — 25000128 H RX IP 250 OP 636: Performed by: PHYSICIAN ASSISTANT

## 2017-04-17 PROCEDURE — 25000132 ZZH RX MED GY IP 250 OP 250 PS 637: Performed by: STUDENT IN AN ORGANIZED HEALTH CARE EDUCATION/TRAINING PROGRAM

## 2017-04-17 RX ORDER — PROMETHAZINE HYDROCHLORIDE 50 MG/ML
25 INJECTION, SOLUTION INTRAMUSCULAR; INTRAVENOUS EVERY 6 HOURS PRN
Status: DISCONTINUED | OUTPATIENT
Start: 2017-04-17 | End: 2017-04-19 | Stop reason: HOSPADM

## 2017-04-17 RX ORDER — PROMETHAZINE HYDROCHLORIDE 25 MG/ML
25 INJECTION, SOLUTION INTRAMUSCULAR; INTRAVENOUS EVERY 6 HOURS PRN
Status: DISCONTINUED | OUTPATIENT
Start: 2017-04-17 | End: 2017-04-17

## 2017-04-17 RX ORDER — MORPHINE SULFATE 2 MG/ML
1-2 INJECTION, SOLUTION INTRAMUSCULAR; INTRAVENOUS
Status: DISCONTINUED | OUTPATIENT
Start: 2017-04-17 | End: 2017-04-19 | Stop reason: HOSPADM

## 2017-04-17 RX ADMIN — CEPHALEXIN 250 MG: 250 CAPSULE ORAL at 00:03

## 2017-04-17 RX ADMIN — OXYCODONE HYDROCHLORIDE 10 MG: 5 TABLET ORAL at 06:33

## 2017-04-17 RX ADMIN — NICOTINE 1 PATCH: 7 PATCH, EXTENDED RELEASE TRANSDERMAL at 09:03

## 2017-04-17 RX ADMIN — SODIUM CHLORIDE, PRESERVATIVE FREE: 5 INJECTION INTRAVENOUS at 00:03

## 2017-04-17 RX ADMIN — OXYCODONE HYDROCHLORIDE 10 MG: 5 TABLET ORAL at 16:14

## 2017-04-17 RX ADMIN — ACETAMINOPHEN 650 MG: 325 TABLET, FILM COATED ORAL at 03:04

## 2017-04-17 RX ADMIN — ACETAMINOPHEN 650 MG: 325 TABLET, FILM COATED ORAL at 10:12

## 2017-04-17 RX ADMIN — ONDANSETRON 4 MG: 2 INJECTION INTRAMUSCULAR; INTRAVENOUS at 10:22

## 2017-04-17 RX ADMIN — OXYCODONE HYDROCHLORIDE 10 MG: 5 TABLET ORAL at 03:04

## 2017-04-17 RX ADMIN — MORPHINE SULFATE 1 MG: 2 INJECTION, SOLUTION INTRAMUSCULAR; INTRAVENOUS at 09:02

## 2017-04-17 RX ADMIN — OXYCODONE HYDROCHLORIDE 10 MG: 5 TABLET ORAL at 00:03

## 2017-04-17 RX ADMIN — MORPHINE SULFATE 1 MG: 2 INJECTION, SOLUTION INTRAMUSCULAR; INTRAVENOUS at 13:02

## 2017-04-17 RX ADMIN — ACETAMINOPHEN 650 MG: 325 TABLET, FILM COATED ORAL at 06:33

## 2017-04-17 RX ADMIN — OXYCODONE HYDROCHLORIDE 10 MG: 5 TABLET ORAL at 13:32

## 2017-04-17 RX ADMIN — BISACODYL 15 MG: 5 TABLET, COATED ORAL at 09:03

## 2017-04-17 RX ADMIN — CEPHALEXIN 250 MG: 250 CAPSULE ORAL at 05:06

## 2017-04-17 RX ADMIN — MORPHINE SULFATE 1 MG: 2 INJECTION, SOLUTION INTRAMUSCULAR; INTRAVENOUS at 18:19

## 2017-04-17 RX ADMIN — POLYETHYLENE GLYCOL 3350 17 G: 17 POWDER, FOR SOLUTION ORAL at 09:03

## 2017-04-17 RX ADMIN — ACETAMINOPHEN 650 MG: 325 TABLET, FILM COATED ORAL at 18:19

## 2017-04-17 RX ADMIN — MORPHINE SULFATE 1 MG: 2 INJECTION, SOLUTION INTRAMUSCULAR; INTRAVENOUS at 05:07

## 2017-04-17 RX ADMIN — CEPHALEXIN 250 MG: 250 CAPSULE ORAL at 13:02

## 2017-04-17 RX ADMIN — ACETAMINOPHEN 325 MG: 325 TABLET, FILM COATED ORAL at 13:31

## 2017-04-17 RX ADMIN — OXYCODONE HYDROCHLORIDE 10 MG: 5 TABLET ORAL at 10:12

## 2017-04-17 RX ADMIN — SENNOSIDES AND DOCUSATE SODIUM 2 TABLET: 8.6; 5 TABLET ORAL at 09:03

## 2017-04-17 RX ADMIN — CEPHALEXIN 250 MG: 250 CAPSULE ORAL at 18:19

## 2017-04-17 ASSESSMENT — VISUAL ACUITY
OU: NORMAL ACUITY

## 2017-04-17 NOTE — PROGRESS NOTES
Lumbar drain removed at bedside.  Catheter intact upon removal.  One 3.0 nylon suture placed at insertion site.  Patient tolerated well.  Patient will remain on bedrest until 1315 today.

## 2017-04-17 NOTE — PROGRESS NOTES
The patient was seen today as part of f/u of residual numbness, tingling and pain in right arm from prolonged BP cuff insufflation during the surgery on 4/14.    The patient reports that all symptoms in the right upper extremity are resolved and the arm feels normal. No motor or sensory changes in the rt arm.     No further f/u is necessary unless changes in symptoms.    Valdez Julio MD

## 2017-04-17 NOTE — PLAN OF CARE
Problem: Goal Outcome Summary  Goal: Goal Outcome Summary  Outcome: Improving  POD #2 LD placement from spontaneously CSF leak. VSS. Neuro unchanged: RUE tingly with +pulses, warm, and strength 5/5. Pt c/o HA controlled with PRN IV morphine and PO oxy this shift. Voiding spontaneously. No BM this shift. Regular diet; good intake. LD site CDI; clamped throughout day. No signs/symptoms of CSF leak. Nasal packing taped to nostril; scant bloody drainage from R nare; MD aware. Up with SBA. Continue to monitor and follow current POC.

## 2017-04-17 NOTE — OP NOTE
DATE OF PROCEDURE:  04/14/2017        PREOPERATIVE DIAGNOSIS:  Right sphenoid sinus spontaneous cerebrospinal fluid leak.      POSTOPERATIVE DIAGNOSIS:  Right sphenoid sinus spontaneous cerebrospinal fluid leak.      PROCEDURE:  Transnasal endoscopic image-guided repair of right sphenoid sinus CSF leak with nasal septal flap.      SURGEON:  Emily Person MD      ASSISTANT:  Case Garza MD      ANESTHESIA:  General endotracheal.      BLOOD LOSS:  50 mL      REPLACEMENT:  Crystalloid.      COMPLICATIONS:  None.      FINDINGS:  Right meningocele repaired with an inlay graft of Durepair and onlay nasal septal flap.      INDICATIONS FOR PROCEDURE:  Khadar Weems presents with spontaneous CSF leak.  He was found have dehiscence of the posterior wall of the sphenoid sinus on the right.  He presents for the above-stated procedure.      OPERATIVE PROCEDURE:  After risks and benefits were explained, the patient signed consent was obtained, taken to the operating room, placed supine on the table.  General anesthesia administered.  He was endotracheally intubated and sterilely draped.  The headset is placed.  Registration, calibration, verification was performed and image guidance was used throughout the procedure.  Nose was injected with 1% lidocaine with epinephrine.  A zero degree rigid endoscope is used throughout the procedure for visualization. The procedure commences with outfracturing of the turbinates, visualization of the sphenoid ostium and elevation of the nasal septal flap on the right side.  Once the nasoseptal flap was placed into nasopharynx, sphenoidotomy site is widened, superior turbinate is removed, posterior ethmoids are opened, the meningocele is well visualized.  The meningocele is resected along with surrounding mucosa.  The edges of the bony defect are freshened.  A small inlay graft with Durepair is then placed.  Once adequate mucosa had been removed and the sphenoid sinus was widely opened,  the nasal septal flap was positioned, packing was placed against the nasoseptal flap to hold it in approximation over the defect.  Gelfoam packing is used within the sphenoid sinus, followed by Merocel sponge against the face of the sphenoid.  The nasal septum was then covered with Harrington splints.  This completes the procedure.  The patients care is transferred t Neurosurgery for placement of lumbar drain and Anesthesia for wake up.  At the end of the procedure, it is noted that his blood pressure cuff had been inflated early in the procedure and remained permanently inflated on his right arm.  Right arm was initially slightly pale after removal of the blood pressure cuff.  Circulation and pulse appear to be intact.  He is followed closely by Anesthesia from that point on.         KIMBERLY BREWER MD             D: 2017 22:15   T: 2017 03:52   MT: [email or web address]      Name:     BONNIE SOLORIO   MRN:      [id number]        Account:        [id number]   :      1985           Procedure Date: 2017      Document: [id number]

## 2017-04-17 NOTE — PROGRESS NOTES
"Otolaryngology Daily Progress Note  4/17/2017    S:  No acute events overnight. No drainage from the nose or down the back of the throat. No salty or metallic taste. States resolution of right arm hypoesthesia. Complains of facial pressure headaches.     Objective:  /81 (BP Location: Left arm)  Pulse 81  Temp 97.1  F (36.2  C) (Oral)  Resp 16  Ht 1.803 m (5' 11\")  Wt 113.9 kg (251 lb 3.2 oz)  SpO2 95%  BMI 35.04 kg/m2   General: Alert and oriented x 3, No acute distress   HEENT: EOMI. PERRL, Nasal packing in place. No drainage. No drainage in the oropharynx.     Pulmonary: Breathing non-labored, no stridor, no accessory muscle use.   Extremities: right arm soft, warm, pink, sensation intact and fingers mobile. No meningeal signs.         Intake/Output Summary (Last 24 hours) at 04/16/17 1045  Last data filed at 04/16/17 1000   Gross per 24 hour   Intake          5291.67 ml   Output             1422 ml   Net          3869.67 ml       LABS:  ROUTINE IP LABS (Last four results)  BMP    Recent Labs  Lab 04/14/17  1710 04/11/17  1804    139   POTASSIUM 4.5 3.9   CHLORIDE  --  103   RAZA  --  9.3   CO2  --  28   BUN  --  10   CR  --  0.82   * 81     CBC    Recent Labs  Lab 04/14/17  1710 04/13/17  0742 04/12/17  0736 04/11/17  1804   WBC  --  11.5* 11.0 9.7   RBC  --  4.62 4.77 5.08   HGB 14.1 13.7 14.2 15.0   HCT  --  41.2 42.0 44.3   MCV  --  89 88 87   MCH  --  29.7 29.8 29.5   MCHC  --  33.3 33.8 33.9   RDW  --  13.5 13.5 13.0   PLT  --  330 324 415     INR    Recent Labs  Lab 04/11/17  1840   INR 1.08       Imaging:  CT 4/11/17:  Defects in the posterior wall of the sphenoid sinus  compatible with provided history of suspected CSF leak. Associated near complete opacification of the right sphenoid locule.    MRI 4/15/17:  1. T1 and T2 isointense soft tissue lesion centered in the enlarged right posterior sphenoid sinus locule measuring 1.1 x 1.1 x 1.3 cm, without associated contrast " enhancement or restricted diffusion. This could represent a mucocele in a loculation of the posterior right sphenoid locule with resultant bony erosion. Given the lack of enhancement neoplastic considerations are considered less likely.  2. T2 hyperintense signal seen within the enlarged right sphenoid locule could represent trapped secretions or CSF. Correlation with beta-transferrin testing is recommended.    Assessment/Plan:  Mr. 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 POD 3 s/p endoscopic repair of sphenoid sinus dehiscence with nasoseptal flap. Doyles and merocels in place.  - Merocels in place x 5 days, will remove on Wednesday 4/18  - Harrington's in place for 3 weeks  - LD per neurosurgery, clamped for 24 hours since 4/16. Ok from ENT standpoint to remove lumbar drain. Will defer to Neurosurgery.   - will continue to monitor right arm for signs of compartment syndrome    Patient and plan discussed with Dr. Raza Scott MD  Otolaryngology Resident  Please contact ENT with questions by dialing * [phone] and entering job code [phone] when prompted

## 2017-04-17 NOTE — PLAN OF CARE
Problem: Goal Outcome Summary  Goal: Goal Outcome Summary  Outcome: No Change  Here for spontaneously CSF leak. LD removed at 1215 this afternoon, suture in place, CDI. VSS. Neuro unchanged: RUE numb and tingly (almost gone) with +pulses, warm, and strength 5/5. Pt c/o HA and nasal pressure uncontrolled with PRN IV morphine and PO oxy this shift, also scheduled Tylenol. Emesisx1 this afternoon d/t sever HA, Zofran given x1. Voiding spontaneously. No BM this shift, +flatus; PRN PO dulcolax and miralax given. Regular diet; poor intake. No signs/symptoms of CSF leak. Nasal packing in place, but coming out of nare; scant bloody drainage from R nare; MD aware. Up ind./SBA in room. Continue to monitor and follow current POC.

## 2017-04-17 NOTE — PROGRESS NOTES
"Neurosurgery Daily Progress Note  4/17/2017    Overnight events/subjective: No drainage from nose. Continuing to ambulate and do well postop. R arm tingling improving     O/ /68 (BP Location: Right arm)  Pulse 70  Temp 96.7  F (35.9  C) (Oral)  Resp 16  Ht 1.803 m (5' 11\")  Wt 113.9 kg (251 lb 3.2 oz)  SpO2 96%  BMI 35.04 kg/m2    Exam:   Gen: Laying in bed, not in acute distress  MS: A&Ox3, Speech fluent and conversant   CN: Pupils round and reactive, extraocular movements intact, face symmetric, tongue midline, uvula & palate elevate symmetrically   Motor: 5/5 in b/l UE& LEs  Sensory: intact to light touch   No drift  Scant bloody drainage from nose; no clear gush of  Fluid     Non labored breathing    LABS: none     LD output: clamped since 4/16     A/P: Khadar Weems is a 31 year old M w/ spontaneous CSF leak s/p endonasal repair by ENT, and lumbar drain placement.  -LD clamped   -Will tentatively plan to LD in afternoon; will have to run by primary team, ENT, prior to pulling     Tom Sierra, Neurosurgery, PGY-3  Please contact the neurosurgery resident on call with questions by dialing * * [phone], then entering [phone] when prompted    "

## 2017-04-17 NOTE — PLAN OF CARE
Problem: Goal Outcome Summary  Goal: Goal Outcome Summary  Outcome: Improving  POD #3 LD placement from spontaneous CSF leak. HTN within parameters and tachy at times. Neuros unchanged: RUE tingling with +pulses, warm, and 5/5 strength. Pt c/o HA and pressure behind nose controlled with PRN IV morphine x1, Tylenol and PO oxycodone. Voiding spontaneously. No BM this shift; None since LD placement - had Dulcolax prn yesterday but no result. Regular diet; good intake. LD clamped- site CDI. Nasal packing in place; with small amount of bloody red drainage out of right nare ; packing had shifted down some yesterday morning - can t visualize packing; about an inch of string from nasal packing is taped to nose - no further movement - ENT saw this morning and is aware - plan is to remove it later today. Pt denies metallic taste. Up SBA. Continue to monitor and follow current POC.

## 2017-04-18 PROCEDURE — 25000132 ZZH RX MED GY IP 250 OP 250 PS 637: Performed by: STUDENT IN AN ORGANIZED HEALTH CARE EDUCATION/TRAINING PROGRAM

## 2017-04-18 PROCEDURE — 25000128 H RX IP 250 OP 636: Performed by: OTOLARYNGOLOGY

## 2017-04-18 PROCEDURE — 25000132 ZZH RX MED GY IP 250 OP 250 PS 637: Performed by: OTOLARYNGOLOGY

## 2017-04-18 PROCEDURE — 25000125 ZZHC RX 250: Performed by: OTOLARYNGOLOGY

## 2017-04-18 PROCEDURE — 25000128 H RX IP 250 OP 636: Performed by: PHYSICIAN ASSISTANT

## 2017-04-18 PROCEDURE — 12000008 ZZH R&B INTERMEDIATE UMMC

## 2017-04-18 RX ORDER — MAGNESIUM CARB/ALUMINUM HYDROX 105-160MG
296 TABLET,CHEWABLE ORAL
Status: DISCONTINUED | OUTPATIENT
Start: 2017-04-18 | End: 2017-04-19 | Stop reason: HOSPADM

## 2017-04-18 RX ADMIN — ACETAMINOPHEN 650 MG: 325 TABLET, FILM COATED ORAL at 11:06

## 2017-04-18 RX ADMIN — OXYCODONE HYDROCHLORIDE 10 MG: 5 TABLET ORAL at 18:34

## 2017-04-18 RX ADMIN — NICOTINE 1 PATCH: 7 PATCH, EXTENDED RELEASE TRANSDERMAL at 08:43

## 2017-04-18 RX ADMIN — ACETAMINOPHEN 650 MG: 325 TABLET, FILM COATED ORAL at 20:22

## 2017-04-18 RX ADMIN — ONDANSETRON 4 MG: 4 TABLET, ORALLY DISINTEGRATING ORAL at 18:34

## 2017-04-18 RX ADMIN — POLYETHYLENE GLYCOL 3350 17 G: 17 POWDER, FOR SOLUTION ORAL at 13:35

## 2017-04-18 RX ADMIN — CEPHALEXIN 250 MG: 250 CAPSULE ORAL at 18:34

## 2017-04-18 RX ADMIN — OXYCODONE HYDROCHLORIDE 10 MG: 5 TABLET ORAL at 11:06

## 2017-04-18 RX ADMIN — ACETAMINOPHEN 650 MG: 325 TABLET, FILM COATED ORAL at 15:32

## 2017-04-18 RX ADMIN — SENNOSIDES AND DOCUSATE SODIUM 2 TABLET: 8.6; 5 TABLET ORAL at 07:57

## 2017-04-18 RX ADMIN — SENNOSIDES AND DOCUSATE SODIUM 2 TABLET: 8.6; 5 TABLET ORAL at 20:20

## 2017-04-18 RX ADMIN — CEPHALEXIN 250 MG: 250 CAPSULE ORAL at 13:28

## 2017-04-18 RX ADMIN — OXYCODONE HYDROCHLORIDE 10 MG: 5 TABLET ORAL at 21:58

## 2017-04-18 RX ADMIN — PROCHLORPERAZINE EDISYLATE 10 MG: 5 INJECTION INTRAMUSCULAR; INTRAVENOUS at 14:25

## 2017-04-18 RX ADMIN — BISACODYL 15 MG: 5 TABLET, COATED ORAL at 13:27

## 2017-04-18 RX ADMIN — PROCHLORPERAZINE EDISYLATE 10 MG: 5 INJECTION INTRAMUSCULAR; INTRAVENOUS at 08:38

## 2017-04-18 RX ADMIN — BISACODYL 10 MG: 10 SUPPOSITORY RECTAL at 11:06

## 2017-04-18 RX ADMIN — OXYCODONE HYDROCHLORIDE 10 MG: 5 TABLET ORAL at 07:57

## 2017-04-18 RX ADMIN — MORPHINE SULFATE 2 MG: 2 INJECTION, SOLUTION INTRAMUSCULAR; INTRAVENOUS at 14:33

## 2017-04-18 ASSESSMENT — VISUAL ACUITY
OU: NORMAL ACUITY

## 2017-04-18 NOTE — PLAN OF CARE
Problem: Goal Outcome Summary  Goal: Goal Outcome Summary  Outcome: No Change  Pt is here for CSF leak. Had an LD that was pulled yesterday, 1 stitch is in place, open to air, no drainage. Nasal pack of R nare is saturated with dark red drainage (unchanged) and packing has moved just beyond edge of the nostril; MD notified, no interventions planned at this time. VSS; except slightly hypertensive (150's/90's, but within parameters, charge nurse notified), neuros intact. Pt has had a headache for most of the day, improved with scheduled tylenol and PRN oxycodone x 2 and morphine x 1 this shift. Became nauseated this AM while trying to eat breakfast and after shower this afternoon, give compazine x2 with complete relief. Significant constipation, has not had a BM in about a week, given scheduled senna, PRN dulcolax suppository x1, PRN dulcolax oral (15mg) x1 and PRN miralax x1; had a small hard BM this afternoon. PRN enema and mag citrate ordered, could try this evening. Voiding spontaneously. Hasn't eaten such today but has been taking in oral fluids. Up independently, showered this afternoon. PIV SL. Social work consult placed; Mother called with concerns over financial impact of hospitalization.

## 2017-04-18 NOTE — PROGRESS NOTES
"Otolaryngology Daily Progress Note  4/18/2017    S:  No acute events overnight. No drainage from the nose or down the back of the throat. No salty or metallic taste.Had emesis associated with nausea yesterday that has improved currently. Continues to have pressure headache. No vision changes. Per patient, right arm with no motor or sensory deficits. LD removed yesterday by Neurosurgery.     Objective:  BP (!) 155/103 (BP Location: Right arm)  Pulse 64  Temp 95.6  F (35.3  C) (Oral)  Resp 18  Ht 1.803 m (5' 11\")  Wt 113.9 kg (251 lb 3.2 oz)  SpO2 97%  BMI 35.04 kg/m2   General: Alert and oriented x 3, No acute distress   HEENT: EOMI. PERRL, Nasal packing in place. Dried mucoserousdrainage. No drainage in the oropharynx.     Pulmonary: Breathing non-labored, no stridor, no accessory muscle use.   Extremities: right arm soft, warm, pink, sensation intact and fingers mobile. No meningeal signs.         Intake/Output Summary (Last 24 hours) at 04/16/17 1045  Last data filed at 04/16/17 1000   Gross per 24 hour   Intake          5291.67 ml   Output             1422 ml   Net          3869.67 ml       LABS:  ROUTINE IP LABS (Last four results)  BMP    Recent Labs  Lab 04/14/17  1710 04/11/17  1804    139   POTASSIUM 4.5 3.9   CHLORIDE  --  103   RAZA  --  9.3   CO2  --  28   BUN  --  10   CR  --  0.82   * 81     CBC    Recent Labs  Lab 04/14/17  1710 04/13/17  0742 04/12/17  0736 04/11/17  1804   WBC  --  11.5* 11.0 9.7   RBC  --  4.62 4.77 5.08   HGB 14.1 13.7 14.2 15.0   HCT  --  41.2 42.0 44.3   MCV  --  89 88 87   MCH  --  29.7 29.8 29.5   MCHC  --  33.3 33.8 33.9   RDW  --  13.5 13.5 13.0   PLT  --  330 324 415     INR    Recent Labs  Lab 04/11/17  1840   INR 1.08       Imaging:  CT 4/11/17:  Defects in the posterior wall of the sphenoid sinus  compatible with provided history of suspected CSF leak. Associated near complete opacification of the right sphenoid locule.    MRI 4/15/17:  1. T1 and T2 " isointense soft tissue lesion centered in the enlarged right posterior sphenoid sinus locule measuring 1.1 x 1.1 x 1.3 cm, without associated contrast enhancement or restricted diffusion. This could represent a mucocele in a loculation of the posterior right sphenoid locule with resultant bony erosion. Given the lack of enhancement neoplastic considerations are considered less likely.  2. T2 hyperintense signal seen within the enlarged right sphenoid locule could represent trapped secretions or CSF. Correlation with beta-transferrin testing is recommended.    Assessment/Plan:  Mr. 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 POD 4 s/p endoscopic repair of sphenoid sinus dehiscence with nasoseptal flap. Doyles and merocels in place.  - Merocels in place x 5 days, will remove on Wednesday 4/18. Will continue on antibiotic prophylaxis until nasal packing removed.   - Harrington's in place for 3 weeks  - LD removed by Neurosurgery yesterday.   - Anesthesia evaluated right arm last night noting no significant sequelae with no current motor or sensory deficits noted.     Patient and plan discussed with Dr. Raza Scott MD  Otolaryngology Resident  Please contact ENT with questions by dialing * * [phone] and entering job code [phone] when prompted

## 2017-04-19 VITALS
TEMPERATURE: 96 F | RESPIRATION RATE: 16 BRPM | BODY MASS INDEX: 35.17 KG/M2 | WEIGHT: 251.2 LBS | HEIGHT: 71 IN | SYSTOLIC BLOOD PRESSURE: 144 MMHG | DIASTOLIC BLOOD PRESSURE: 96 MMHG | OXYGEN SATURATION: 98 % | HEART RATE: 95 BPM

## 2017-04-19 PROCEDURE — 25000132 ZZH RX MED GY IP 250 OP 250 PS 637: Performed by: OTOLARYNGOLOGY

## 2017-04-19 PROCEDURE — 25000132 ZZH RX MED GY IP 250 OP 250 PS 637: Performed by: STUDENT IN AN ORGANIZED HEALTH CARE EDUCATION/TRAINING PROGRAM

## 2017-04-19 RX ORDER — AMOXICILLIN 250 MG
1-2 CAPSULE ORAL 2 TIMES DAILY
Qty: 50 TABLET | Refills: 0 | Status: SHIPPED | OUTPATIENT
Start: 2017-04-19 | End: 2017-05-04

## 2017-04-19 RX ORDER — POLYETHYLENE GLYCOL 3350 17 G/17G
17 POWDER, FOR SOLUTION ORAL DAILY PRN
Qty: 14 PACKET | Refills: 0 | Status: SHIPPED | OUTPATIENT
Start: 2017-04-19 | End: 2017-05-04

## 2017-04-19 RX ORDER — OXYCODONE HYDROCHLORIDE 5 MG/1
5-10 TABLET ORAL
Qty: 50 TABLET | Refills: 0 | Status: SHIPPED | OUTPATIENT
Start: 2017-04-19 | End: 2017-05-04

## 2017-04-19 RX ORDER — BISACODYL 5 MG
5-15 TABLET, DELAYED RELEASE (ENTERIC COATED) ORAL DAILY PRN
Qty: 60 TABLET | Refills: 0 | Status: SHIPPED | OUTPATIENT
Start: 2017-04-19 | End: 2017-05-04

## 2017-04-19 RX ORDER — ECHINACEA PURPUREA EXTRACT 125 MG
1 TABLET ORAL DAILY PRN
Qty: 2 BOTTLE | Refills: 2 | Status: SHIPPED | OUTPATIENT
Start: 2017-04-19 | End: 2021-06-02

## 2017-04-19 RX ORDER — ACETAMINOPHEN 325 MG/1
650 TABLET ORAL EVERY 4 HOURS
Qty: 100 TABLET | Refills: 1 | Status: SHIPPED | OUTPATIENT
Start: 2017-04-19 | End: 2021-06-02

## 2017-04-19 RX ADMIN — OXYCODONE HYDROCHLORIDE 10 MG: 5 TABLET ORAL at 04:03

## 2017-04-19 RX ADMIN — CEPHALEXIN 250 MG: 250 CAPSULE ORAL at 07:01

## 2017-04-19 RX ADMIN — OXYCODONE HYDROCHLORIDE 10 MG: 5 TABLET ORAL at 00:44

## 2017-04-19 RX ADMIN — SENNOSIDES AND DOCUSATE SODIUM 2 TABLET: 8.6; 5 TABLET ORAL at 07:42

## 2017-04-19 RX ADMIN — OXYCODONE HYDROCHLORIDE 10 MG: 5 TABLET ORAL at 07:01

## 2017-04-19 RX ADMIN — ACETAMINOPHEN 650 MG: 325 TABLET, FILM COATED ORAL at 04:04

## 2017-04-19 RX ADMIN — CEPHALEXIN 250 MG: 250 CAPSULE ORAL at 12:34

## 2017-04-19 RX ADMIN — OXYCODONE HYDROCHLORIDE 10 MG: 5 TABLET ORAL at 12:34

## 2017-04-19 RX ADMIN — ACETAMINOPHEN 650 MG: 325 TABLET, FILM COATED ORAL at 00:43

## 2017-04-19 RX ADMIN — ACETAMINOPHEN 650 MG: 325 TABLET, FILM COATED ORAL at 07:42

## 2017-04-19 RX ADMIN — ACETAMINOPHEN 650 MG: 325 TABLET, FILM COATED ORAL at 12:35

## 2017-04-19 RX ADMIN — OXYCODONE HYDROCHLORIDE 10 MG: 5 TABLET ORAL at 09:29

## 2017-04-19 RX ADMIN — NICOTINE 1 PATCH: 7 PATCH, EXTENDED RELEASE TRANSDERMAL at 07:43

## 2017-04-19 RX ADMIN — CEPHALEXIN 250 MG: 250 CAPSULE ORAL at 00:44

## 2017-04-19 ASSESSMENT — VISUAL ACUITY
OU: NORMAL ACUITY

## 2017-04-19 NOTE — PLAN OF CARE
Problem: Goal Outcome Summary  Goal: Goal Outcome Summary  Outcome: Improving  Patient admitted for spontaneous CSF leak. VSS. A&Ox4. Neuros intact other than HA. Pain controlled with oxycodone x2 and scheduled Tylenol. Nasal packing in right nostril in place, old/dried bloody drainage. No new drainage noted. Up independently. Regular diet. PIV SL. Voiding spontaneously, had several stools last evening. Will continue to monitor.

## 2017-04-19 NOTE — PROGRESS NOTES
Patient seen at bedside yesterday afternoon.  No signs of leak.  Symptoms minimal.  Discussed signs and symptoms of leak, meningitis, and intracranial hypertension.  He will contact us if he has any concerns.    Plan was to remove pack this am, which was done.  If no leakage will DC to home.      Nasal splints for 3 weeks.

## 2017-04-19 NOTE — PLAN OF CARE
Problem: Goal Outcome Summary  Goal: Goal Outcome Summary  Outcome: Improving  Spontaneous CSF Leak S/P LD placement-now dc d      Sleepy at the start of shift.  Pt states nausea and pain improved. Premed w/ po zofran prior to meal (able to eat full meal). Oxycodone helpful for HA. R) nares packing in place with dark red drainage, no observable leakage observed or reported. Pt denies vision changes N-intact. Mild HTN otherwise VSS. SL, VDSP, Reg diet, pt with steady gait, pt had not had BM x 1 week multiple medications given today with some hard BM results.

## 2017-04-19 NOTE — PLAN OF CARE
"Problem: Goal Outcome Summary  Goal: Goal Outcome Summary  Addendum  Pt reports 2 more BM, softer now and \"a large amount\"  per pt.      "

## 2017-04-19 NOTE — DISCHARGE SUMMARY
Discharge Summary  Khadar Weems  [id number]  1985    Date of Admission: 4/11/2017  Date of Discharge: 4/19/2017    Admission Diagnosis: CSF leak [G96.0]  Discharge Diagnosis: Same    Procedures:  Date: 4/11/2017  Procedure(s) Transnasal endoscopic image-guided repair of right sphenoid sinus CSF leak with nasal septal flap.     Pathology: none    HPI: Khadar Weems is a 31 year old male with history of spontaneous cerebrospinal fluid leak found to have a dehiscence of the posterior wall of the sphenoid sinus on the right.     It was recommended that he undergo operative intervention and the patient consented to the above procedure after detailed explanation of the risks and benefits of said procedure.    Hospital Course: The patient was admitted to the hospital and underwent the above mentioned procedure. He tolerated the procedure without any intra- or maria-operative complications. Please see the operative report for full details of the procedure. The patient was admitted for post-operative monitoring. His postoperative course was uneventful. At discharge, the patient's pain was well controlled, the patient was voiding on his own, and was ambulating and tolerating a regular diet.     Discharge Exam:  Vitals:    04/18/17 2236 04/19/17 0723 04/19/17 1230 04/19/17 1238   BP: 133/83 (!) 147/99 (!) 154/97 (!) 144/96   BP Location:  Right arm Right arm    Cuff Size:       Pulse:  72 105    Resp: 20 14 16    Temp: 97.4  F (36.3  C) 95.9  F (35.5  C) 96  F (35.6  C)    TempSrc: Oral Oral Oral    SpO2:   98%    Weight:       Height:           General: Alert and oriented x 3, No acute distress  HEENT: EOMI. Dried mucoserousdrainage. No anterior nasal drainage or drainage in the oropharynx.   Pulmonary: Breathing non-labored, no stridor, no accessory muscle use.  Extremities: right arm soft, warm, pink, sensation intact and fingers mobile.   Neuro: CN II-XII grossly intact. No meningeal signs.     Discharge  Medications:   Khadar Weems   Home Medication Instructions [id number]    Printed on:04/19/17 2163   Medication Information                      acetaminophen (TYLENOL) 325 MG tablet  Take 2 tablets (650 mg) by mouth every 4 hours             bisacodyl (DULCOLAX) 5 MG EC tablet  Take 1-3 tablets (5-15 mg) by mouth daily as needed for constipation ( )             cetirizine (ZYRTEC) 10 MG tablet  Take 10 mg by mouth daily             Ginger, Zingiber officinalis, (GINGER ROOT) 550 MG CAPS capsule  Take 550 mg by mouth daily             loratadine (CLARITIN) 10 MG tablet  Take 1 tablet by mouth daily.             oxyCODONE (ROXICODONE) 5 MG IR tablet  Take 1-2 tablets (5-10 mg) by mouth every 3 hours as needed for moderate to severe pain             polyethylene glycol (MIRALAX/GLYCOLAX) Packet  Take 17 g by mouth daily as needed for constipation             senna-docusate (SENOKOT-S;PERICOLACE) 8.6-50 MG per tablet  Take 1-2 tablets by mouth 2 times daily             sodium chloride (OCEAN) 0.65 % nasal spray  Spray 1 spray into both nostrils daily as needed for congestion                   Discharge Procedure Orders  Reason for your hospital stay   Order Comments: Post-operative care     Adult [facility] Follow-up and recommended labs and tests   Order Comments: Follow up in ENT clinic with Dr. Person in 2-3 weeks for removal of nasal splints. Please call the clinic with questions/concerns: [phone].    Otolaryngology/ENT Clinic:  [facility]  [address] [place] [id number]      Appointments on [place] and/or [facility] (with [facility] or [facility] provider or service). Call [phone] if you haven't heard regarding these appointments within 7 days of discharge.     Activity   Order Comments: Your activity upon discharge: No heavy lifting greater than 10 lbs and no strenuous exercise for 2 weeks or until follow up appointment. No driving  "while taking narcotic pain medications.   Order Specific Question Answer Comments   Is discharge order? Yes      When to contact your care team   Order Comments: Please notify your doctor if you experience persistent nasal drainage, sustained bleeding from the nose, or increasing redness, swelling, and/or purulent malorodorous discharge from the nose which may indicate infection. Notify your doctor if you experience worsening headaches, vision changes, neck pain/stiffness. If you feel it is acute, or experience sudden changes in breathing, chest pain, or excessive sleepiness/somnolence please return to the emergency department or call 911. If you have questions or concerns during the day please call ENT clinic and [phone]. If at night you can call [facility] at [phone] and ask for the \"ENT resident on call\".     Discharge Instructions   Order Comments: Use nasal saline spray to help remove crusting from the nose. No nose blowing, no straining, no lifting greater than 10 lbs, no strenuous activity.     Full Code     Diet   Order Comments: Follow this diet upon discharge: Regular diet   Order Specific Question Answer Comments   Is discharge order? Yes          Dispo: To home in good condition. All of the patient's questions/concerns have been addressed at this time.     Olesya Landaverde PA-C  Otolaryngology-Head & Neck Surgery  Please contact ENT by dialing * * [phone] and entering job code [phone].    "

## 2017-04-19 NOTE — PLAN OF CARE
Problem: Goal Outcome Summary  Goal: Goal Outcome Summary  Outcome: Adequate for Discharge Date Met:  04/19/17  Pt is A&O X4. Neuro intact. Pain managed with oxycodone and Tylenol.Nasal packing was removed this AM, but nasal sling in place pt is aware to call and make a follow up appointment. Discharge instruction went over and verbalized understanding with a teach back. PIV removed. Pt escorted to pharmacy and front door via Transport.

## 2017-04-20 ENCOUNTER — CARE COORDINATION (OUTPATIENT)
Dept: CARDIOLOGY | Facility: CLINIC | Age: 32
End: 2017-04-20

## 2017-04-20 NOTE — PROGRESS NOTES
"[facility]  \"Hello, my name is Obdulia Spangler , and I am calling from the [facility].  I want to check in and see how you are doing, after leaving the hospital.  You may also receive a call from your Care Coordinator (care team), but I want to make sure you don t have any urgent needs.  I have a couple questions to review with you:     Post-Discharge Outreach                                                    Khadar Weems is a 31 year old male     Follow-up Appointments           Adult [facility] Follow-up and recommended labs and tests       Follow up in ENT clinic with Dr. Person in 2-3 weeks for removal of nasal splints. Please call the clinic with questions/concerns: [phone].                Care Team:    Patient Care Team       Relationship Specialty Notifications Start End    Debbie Mckeon APRN CNP PCP - General Family Practice  5/24/10     Phone: [phone] Fax: [phone]         [facility] [address] [place] [address]            Transition of Care Review                                                      Did you have a surgery or procedure during your hospital visit? Yes   If yes, do you have any of the following:     Signs of infection:  No    Pain:  Yes     Pain Scale (0-10) 5/10     Location: Surg site    Wound/incision concerns? NO    Do you have all of your medications/refills?  Yes    Are you having any side effects or questions about your medication(s)? No    Do you have any new or worsening symptoms?  Snot in nose, vomited a couple times    Do you have any future appointments scheduled?   Not yet             Plan                                                      Thanks for your time.  Your Care Coordinator may follow-up within the next couple days.  In the meantime if you have questions, concerns or problems call your care team.        Obdulia Spangler    "

## 2017-04-26 LAB — COPATH REPORT: NORMAL

## 2017-05-04 ENCOUNTER — OFFICE VISIT (OUTPATIENT)
Dept: OTOLARYNGOLOGY | Facility: CLINIC | Age: 32
End: 2017-05-04

## 2017-05-04 DIAGNOSIS — G96.00 CSF LEAK: Primary | ICD-10-CM

## 2017-05-04 RX ORDER — CEPHALEXIN 500 MG/1
500 CAPSULE ORAL 2 TIMES DAILY
Qty: 20 CAPSULE | Refills: 0 | Status: SHIPPED | OUTPATIENT
Start: 2017-05-04 | End: 2021-06-02

## 2017-05-04 ASSESSMENT — PAIN SCALES - GENERAL: PAINLEVEL: NO PAIN (0)

## 2017-05-04 NOTE — MR AVS SNAPSHOT
After Visit Summary   2017    Khadar Weems    MRN: [id number]           Patient Information     Date Of Birth          1985        Visit Information        Provider Department      2017 10:00 AM Emily Person MD [facility] Ear Nose [facility] Throat        Today's Diagnoses     CSF leak    -  1      Care Instructions    Plan of care:  Keflex 500mg twice/day for 7 days  Follow up on  at 1100 with Dr Person  Clinic contact information:  1. To schedule an appointment call [phone], option 1  2. To talk to the Triage RN call [phone], option 3  3. If you need to speak to Maci or get a message to your doctor on a Friday, call the triage RN  4. MaciRN: [phone]  5. Surgery scheduling:      Lali Walker: [phone]      Mary Grace Jessicater: [phone]  6. Fax: [phone]  7. [phone]          Follow-ups after your visit        Your next 10 appointments already scheduled     May 18, 2017 11:00 AM CDT   (Arrive by 10:45 AM)   Return Visit with MD ASHWIN Germain [facility] ([facility])    [address] [place] [id number] [phone]              Who to contact     Please call your clinic at [phone] to:    Ask questions about your health    Make or cancel appointments    Discuss your medicines    Learn about your test results    Speak to your doctor   If you have compliments or concerns about an experience at your clinic, or if you wish to file a complaint, please contact [facility] Patient Relations at [phone] or email us at [email or web address]         Additional Information About Your Visit        MyChart Information     Tirendo is an electronic gateway that provides easy, online access to your medical records. With Tirendo, you can request a clinic appointment, read your test results, renew a prescription or communicate with your care team.     To  sign up for Viewsyt visit the website at [email or web address]   You will be asked to enter the access code listed below, as well as some personal information. Please follow the directions to create your username and password.     Your access code is: [email or web address]  Expires: 7/10/2017  7:17 PM     Your access code will  in 90 days. If you need help or a new code, please contact your [facility] or call [phone] for assistance.        Care EveryWhere ID     This is your Care EveryWhere ID. This could be used by other organizations to access your [facility] medical records  [id number]         Blood Pressure from Last 3 Encounters:   17 (!) 144/96   17 (!) 136/91   12 120/80    Weight from Last 3 Encounters:   17 113.9 kg (251 lb 3.2 oz)   17 111.6 kg (246 lb)   12 111.1 kg (245 lb)              We Performed the Following     NASAL ENDOSCOPY, DIAGNOSTIC          Today's Medication Changes          These changes are accurate as of: 17 11:59 PM.  If you have any questions, ask your nurse or doctor.               Start taking these medicines.        Dose/Directions    cephALEXin 500 MG capsule   Commonly known as:  KEFLEX   Used for:  CSF leak   Started by:  Emily Person MD        Dose:  500 mg   Take 1 capsule (500 mg) by mouth 2 times daily   Quantity:  20 capsule   Refills:  0            Where to get your medicines      These medications were sent to [facility] [address] [facility] [address] [place] [address] [facility] [address]    Hours:  TRANSPLANT PHONE NUMBER [phone] Phone:  [phone]     cephALEXin 500 MG capsule                Primary Care Provider Office Phone # Fax #    DALE Saxena [phone]       [facility] [address] [place] [address]        Thank you!     Thank you for choosing [facility] EAR NOSE  AND THROAT  for your care. Our goal is always to provide you with excellent care. Hearing back from our patients is one way we can continue to improve our services. Please take a few minutes to complete the written survey that you may receive in the mail after your visit with us. Thank you!             Your Updated Medication List - Protect others around you: Learn how to safely use, store and throw away your medicines at www.disposemymeds.org.          This list is accurate as of: 5/4/17 11:59 PM.  Always use your most recent med list.                   Brand Name Dispense Instructions for use    acetaminophen 325 MG tablet    TYLENOL    100 tablet    Take 2 tablets (650 mg) by mouth every 4 hours       cephALEXin 500 MG capsule    KEFLEX    20 capsule    Take 1 capsule (500 mg) by mouth 2 times daily       cetirizine 10 MG tablet    zyrTEC     Take 10 mg by mouth daily Reported on 5/4/2017       ginger root 550 MG Caps capsule      Take 550 mg by mouth daily Reported on 5/4/2017       loratadine 10 MG tablet    CLARITIN    90 tablet    Take 1 tablet by mouth daily.       sodium chloride 0.65 % nasal spray    OCEAN    2 Bottle    Spray 1 spray into both nostrils daily as needed for congestion

## 2017-05-04 NOTE — PROGRESS NOTES
HISTORY OF PRESENT ILLNESS: Khadar returns.  He is status post transnasal endoscopic repair of a spontaneous CSF leak in the right sphenoid sinus.  He states he is doing well other than his nose is really plugged.  It is difficult to breath and sleep.  No signs of a leak.  He has been back at work.  He works on the computer at a desk.        PHYSICAL EXAMINATION:  He is examined.      PROCEDURE:  Topical anesthetic decongestant solution is applied, and debridement of the anterior nasal cavities is performed.  Nasal endoscopy is then performed.  I clipping a stitch for his nasal splint and removed his splints.  The left side is completely clean and clear.  On the right, the septum is healing nicely.  There is a layer of fibrinous debris overlying the septum.  I am able to look back toward the sphenoidotomy site.  It is very tight back there and I cannot see into the sphenoid sinus.  I see no signs of CSF leak or infection.  There is a lot of purulent material in the floor of his nose under the splint.         ASSESSMENT AND PLAN:  I am going to put him on a short course of Keflex and have him continue with saline sprays and saline irrigations.  I will see him back again in two weeks.

## 2017-05-04 NOTE — NURSING NOTE
Chief Complaint   Patient presents with     RECHECK     Return Post op splint removal      Pt states no pain, just problems with sleeping and nasal congestion.       N Abhilash BANEGAS

## 2017-05-04 NOTE — PATIENT INSTRUCTIONS
Plan of care:  Keflex 500mg twice/day for 7 days  Follow up on  at 1100 with Dr Person  Clinic contact information:  1. To schedule an appointment call [phone], option 1  2. To talk to the Triage RN call [phone], option 3  3. If you need to speak to Maci or get a message to your doctor on a Friday, call the triage RN  4. MaciRN: [phone]  5. Surgery scheduling:      Lali Walker: [phone]      Mary Grace Key: [phone]  6. Fax: [phone]  7. [phone]

## 2017-05-04 NOTE — LETTER
5/4/2017       RE: Khadar Weems  [address]  [address]  [place] [address]     Dear Colleague,    Thank you for referring your patient, Khadar Weems, to the [facility] EAR NOSE AND THROAT at [facility]. Please see a copy of my visit note below.    HISTORY OF PRESENT ILLNESS: Khadar returns.  He is status post transnasal endoscopic repair of a spontaneous CSF leak in the right sphenoid sinus.  He states he is doing well other than his nose is really plugged.  It is difficult to breath and sleep.  No signs of a leak.  He has been back at work.  He works on the computer at a desk.        PHYSICAL EXAMINATION:  He is examined.      PROCEDURE:  Topical anesthetic decongestant solution is applied, and debridement of the anterior nasal cavities is performed.  Nasal endoscopy is then performed.  I clipping a stitch for his nasal splint and removed his splints.  The left side is completely clean and clear.  On the right, the septum is healing nicely.  There is a layer of fibrinous debris overlying the septum.  I am able to look back toward the sphenoidotomy site.  It is very tight back there and I cannot see into the sphenoid sinus.  I see no signs of CSF leak or infection.  There is a lot of purulent material in the floor of his nose under the splint.         ASSESSMENT AND PLAN:  I am going to put him on a short course of Keflex and have him continue with saline sprays and saline irrigations.  I will see him back again in two weeks.     Again, thank you for allowing me to participate in the care of your patient.      Sincerely,    Emily Person MD

## 2021-05-14 ENCOUNTER — TRANSFERRED RECORDS (OUTPATIENT)
Dept: HEALTH INFORMATION MANAGEMENT | Facility: CLINIC | Age: 36
End: 2021-05-14

## 2021-06-02 ENCOUNTER — OFFICE VISIT (OUTPATIENT)
Dept: FAMILY MEDICINE | Facility: CLINIC | Age: 36
End: 2021-06-02
Payer: COMMERCIAL

## 2021-06-02 VITALS
WEIGHT: 266 LBS | BODY MASS INDEX: 36.03 KG/M2 | TEMPERATURE: 98.9 F | DIASTOLIC BLOOD PRESSURE: 80 MMHG | OXYGEN SATURATION: 98 % | HEART RATE: 82 BPM | SYSTOLIC BLOOD PRESSURE: 140 MMHG | HEIGHT: 72 IN

## 2021-06-02 DIAGNOSIS — M54.2 CHRONIC NECK PAIN: ICD-10-CM

## 2021-06-02 DIAGNOSIS — M54.6 CHRONIC THORACIC BACK PAIN, UNSPECIFIED BACK PAIN LATERALITY: Primary | ICD-10-CM

## 2021-06-02 DIAGNOSIS — F41.0 PANIC ATTACK: ICD-10-CM

## 2021-06-02 DIAGNOSIS — F06.4 ANXIETY DISORDER DUE TO KNOWN PHYSIOLOGICAL CONDITION: ICD-10-CM

## 2021-06-02 DIAGNOSIS — G89.29 CHRONIC NECK PAIN: ICD-10-CM

## 2021-06-02 DIAGNOSIS — G89.29 CHRONIC THORACIC BACK PAIN, UNSPECIFIED BACK PAIN LATERALITY: Primary | ICD-10-CM

## 2021-06-02 PROCEDURE — 96127 BRIEF EMOTIONAL/BEHAV ASSMT: CPT | Performed by: STUDENT IN AN ORGANIZED HEALTH CARE EDUCATION/TRAINING PROGRAM

## 2021-06-02 PROCEDURE — 99205 OFFICE O/P NEW HI 60 MIN: CPT | Performed by: STUDENT IN AN ORGANIZED HEALTH CARE EDUCATION/TRAINING PROGRAM

## 2021-06-02 RX ORDER — DULOXETIN HYDROCHLORIDE 30 MG/1
30 CAPSULE, DELAYED RELEASE ORAL DAILY
Qty: 60 CAPSULE | Refills: 0 | Status: SHIPPED | OUTPATIENT
Start: 2021-06-02 | End: 2021-06-16

## 2021-06-02 RX ORDER — HYDROXYZINE HYDROCHLORIDE 25 MG/1
25-50 TABLET, FILM COATED ORAL EVERY 8 HOURS PRN
Qty: 60 TABLET | Refills: 0 | Status: SHIPPED | OUTPATIENT
Start: 2021-06-02 | End: 2021-07-28

## 2021-06-02 RX ORDER — COVID-19 ANTIGEN TEST
220 KIT MISCELLANEOUS 2 TIMES DAILY WITH MEALS
COMMUNITY
End: 2022-03-29

## 2021-06-02 ASSESSMENT — ANXIETY QUESTIONNAIRES
GAD7 TOTAL SCORE: 19
GAD7 TOTAL SCORE: 19
7. FEELING AFRAID AS IF SOMETHING AWFUL MIGHT HAPPEN: SEVERAL DAYS
5. BEING SO RESTLESS THAT IT IS HARD TO SIT STILL: NEARLY EVERY DAY
7. FEELING AFRAID AS IF SOMETHING AWFUL MIGHT HAPPEN: SEVERAL DAYS
6. BECOMING EASILY ANNOYED OR IRRITABLE: NEARLY EVERY DAY
1. FEELING NERVOUS, ANXIOUS, OR ON EDGE: NEARLY EVERY DAY
GAD7 TOTAL SCORE: 19
4. TROUBLE RELAXING: NEARLY EVERY DAY
2. NOT BEING ABLE TO STOP OR CONTROL WORRYING: NEARLY EVERY DAY
3. WORRYING TOO MUCH ABOUT DIFFERENT THINGS: NEARLY EVERY DAY

## 2021-06-02 ASSESSMENT — PATIENT HEALTH QUESTIONNAIRE - PHQ9
SUM OF ALL RESPONSES TO PHQ QUESTIONS 1-9: 15
10. IF YOU CHECKED OFF ANY PROBLEMS, HOW DIFFICULT HAVE THESE PROBLEMS MADE IT FOR YOU TO DO YOUR WORK, TAKE CARE OF THINGS AT HOME, OR GET ALONG WITH OTHER PEOPLE: VERY DIFFICULT
SUM OF ALL RESPONSES TO PHQ QUESTIONS 1-9: 15

## 2021-06-02 ASSESSMENT — MIFFLIN-ST. JEOR: SCORE: 2171.63
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
## 2021-06-02 NOTE — PATIENT INSTRUCTIONS
Follow up with me in 3-4 weeks for recheck anxiety.    Referral to orthopedics for second opinion. We can consider pain clinic referral depending on what they recommend.     Labs next time due to naproxen use.     Mental health referral placed for therapy. Please check with your insurance.     Follow up sooner for any acute issues.    Cymbalta for general anxiety and pain. Hydroxyzine can be used as needed for panic. This can make you sleep so do not use if you are going to drive or operate equipment.         Patient Education     Panic Attack  A panic attack is an extreme fear reaction that comes on for no clear reason. There is often a fear that something terrible will happen or that you may die. The attack may last a few minutes up to a few hours. Between attacks, things will seem quite normal. This condition has a psychological cause and can be treated with the help of a therapist or psychiatrist. Medicine can be very helpful for this problem.  Panic attacks usually come on suddenly, reaches a peak within minutes, and includes at least 4 of these symptoms:    Palpitations, pounding heart, or accelerated heart rate    Sweating    Chills or heat sensations    Trembling or shaking    Sensations of shortness of breath or smothering    Feelings of choking    Chest pain or discomfort    Nausea or abdominal distress    Feeling dizzy, unsteady, light-headed, or faint    Numbness or tingling sensations    Fear of dying    Fear of going crazy or of losing control    Feelings of unreality, strangeness, or detachment from the environment  Many of these symptoms can be linked to physical problems, so it is sometimes necessary to rule out conditions like thyroid disorders, heart disease, gastrointestinal problems, and others. They can also start as physical symptoms, but psychologically we may react to them in a fearful way, worsening the way we react and feel.  Home care    Try to find the sources of stress in your  life. They may not be obvious. These may include:  ? Daily hassles of life which pile up (traffic jams, missed appointments, car troubles).  ? Major life changes, both good (new baby, job promotion) and bad (loss of job, loss of loved one).  ? Feeling that you have too many responsibilities and can't take care of everything at once.  ? Helplessness: feeling like your problems are too much for you to handle.    Notice how your body reacts to stress. Learn to listen to your body signals so that you can take action before the stress becomes severe.    Try to be aware of what you were doing before the reaction started; this may give you clues to things that can trigger a reaction. It may be situations in your life, or what you were doing at the time.    When possible, avoid or reduce the cause of stress. Avoid hassles, limit the amount of change that is happening in your life at one time or take a break when you feel overloaded.    Unfortunately, you can't stay away from many stressful situations. So you need to learn how to manage stress better. Many proven methods will reduce your anxiety. These include simple things like exercise, good nutrition, and adequate rest. Also, there are certain techniques that are helpful: relaxation and breathing exercises, visualization, biofeedback, meditation, or simply taking time-out to clear your mind. For more information about this, ask your doctor or go to a local bookstore and review the many books and tapes available on this subject.  Follow-up care  Follow-up with your healthcare provider, or as advised.  Call 911  Call 911 if you:    Have suicidal thoughts, a suicide plan, and the means to carry out the plan    Have serious thoughts of hurting someone else    Have trouble breathing    Are very confused    Feel very drowsy or have trouble awakening    Faint or lose consciousness    Have new chest pain that becomes more severe, lasts longer, or spreads into your shoulder, arm,  neck, jaw, or back    Have a very rapid or irregular heartbeat    Have a seizure  When to seek medical advice  Call your healthcare provider right away if any of these occur:    Worsening of your symptoms to the point of feeling out-of-control    Feeling that you may try to harm yourself or another    Can't sleep or eat for 3 days in a row    Increased pain with breathing    Increasing feeling of weakness or dizziness    Cough with dark colored sputum (phlegm) or blood    Fever of 100.4 F (38 C) or higher, or as directed by your healthcare provider    Swelling, pain, or redness in one leg    Requests by family or friends for you to seek help for your symptoms  [place] last reviewed this educational content on 3/1/2018    5176-9119 [employer]. All rights reserved. This information is not intended as a substitute for professional medical care. Always follow your healthcare professional's instructions.

## 2021-06-03 ASSESSMENT — PATIENT HEALTH QUESTIONNAIRE - PHQ9: SUM OF ALL RESPONSES TO PHQ QUESTIONS 1-9: 15

## 2021-06-03 ASSESSMENT — ANXIETY QUESTIONNAIRES: GAD7 TOTAL SCORE: 19

## 2021-06-08 ENCOUNTER — NURSE TRIAGE (OUTPATIENT)
Dept: FAMILY MEDICINE | Facility: CLINIC | Age: 36
End: 2021-06-08

## 2021-06-08 DIAGNOSIS — F41.0 PANIC ATTACK: Primary | ICD-10-CM

## 2021-06-08 RX ORDER — LORAZEPAM 0.5 MG/1
.5-1 TABLET ORAL EVERY 6 HOURS PRN
Qty: 9 TABLET | Refills: 0 | Status: SHIPPED | OUTPATIENT
Start: 2021-06-08 | End: 2021-06-16

## 2021-06-08 NOTE — TELEPHONE ENCOUNTER
.Reason for Call:  Anxiety medication change request    Detailed comments: Patient is calling because his anxiety has gotten worse since taking new medication. Has not improved anxiety attacks and patient has had to leave work due to his anxiety taking over. He is wondering if there are any other recommendations for patient. He has a follow up appointment scheduled but he does not believe he can make it that long with his anxiety without help. Please advise, thank you!     Phone Number Patient can be reached at: Cell number on file:    Telephone Information:   Mobile [phone]       Best Time: any    Can we leave a detailed message on this number? YES    Call taken on 6/8/2021 at 3:02 PM by Frances Bangura

## 2021-06-08 NOTE — TELEPHONE ENCOUNTER
All this information was given to pharmacy.  He did call his insurance and he has no coverage at all for mental health  He was given below info from Dr Rodriguez and appt was made for him next week with her.  I again encouraged him to [facility] for [facility] as he will need some type of mental health counseling.    Emily Hamilton RN, BSN  [facility]

## 2021-06-08 NOTE — TELEPHONE ENCOUNTER
"  Reason for Disposition    Symptoms interfere with work or school    Additional Information    Negative: Chest pain    Negative: Palpitations, skipped heart beat, or rapid heart beat    Negative: Cough is main symptom    Negative: Suicide thoughts, threats, attempts, or questions    Negative: Depression is main problem or symptom (e.g., feelings of sadness or hopelessness)    Negative: Severe difficulty breathing (e.g., struggling for each breath, speaks in single words)    Negative: Bluish (or gray) lips or face    Negative: Difficult to awaken or acting confused  (e.g., disoriented, slurred speech)    Negative: Hysterical or combative behavior    Negative: Sounds like a life-threatening emergency to the triager    Negative: Difficulty breathing and persists > 10 minutes and not relieved by reassurance provided by triager    Negative: Lightheadedness or dizziness and persists > 10 minutes and not relieved by reassurance provided by triager    Negative: Alcohol or drug abuse, known or suspected, and feeling very shaky (i.e., visible tremors of hands)    Negative: Patient sounds very sick or weak to the triager    Negative: Patient sounds very upset or troubled to the triager    Answer Assessment - Initial Assessment Questions  1. CONCERN: \"What happened that made you call today?\"      Yesterday had an eye appt and woke up and took his med and went to appt and \"riding an anxiety attack the whole time through and into work, basically 5 hour anxiety attack\"  It only stops when he is so physically exhausted  2. ANXIETY SYMPTOM SCREENING: \"Can you describe how you have been feeling?\"  (e.g., tense, restless, panicky, anxious, keyed up, trouble sleeping, trouble concentrating)      Starts off with back issues, hits arm and that makes back worse, can't get comfortable and mind racing, pouring sweat  3. ONSET: \"How long have you been feeling this way?\"      Slowly getting worse the last 6-9 months santana as back and neck " "getting worse  4. RECURRENT: \"Have you felt this way before?\"  If yes: \"What happened that time?\" \"What helped these feelings go away in the past?\"       The 2 weeks before seeing Dr Rodriguez last time he felt this way as well, over the last month more extreme  5. RISK OF HARM - SUICIDAL IDEATION:  \"Do you ever have thoughts of hurting or killing yourself?\"  (e.g., yes, no, no but preoccupation with thoughts about death)    - INTENT:  \"Do you have thoughts of hurting or killing yourself right NOW?\" (e.g., yes, no, N/A)    - PLAN: \"Do you have a specific plan for how you would do this?\" (e.g., gun, knife, overdose, no plan, N/A)      No, states he had depression when 18 but not since then  6. RISK OF HARM - HOMICIDAL IDEATION:  \"Do you ever have thoughts of hurting or killing someone else?\"  (e.g., yes, no, no but preoccupation with thoughts about death)    - INTENT:  \"Do you have thoughts of hurting or killing someone right NOW?\" (e.g., yes, no, N/A)    - PLAN: \"Do you have a specific plan for how you would do this?\" (e.g., gun, knife, no plan, N/A)       No thoughts of harming others  7. FUNCTIONAL IMPAIRMENT: \"How have things been going for you overall in your life? Have you had any more difficulties than usual doing your normal daily activities?\"  (e.g., better, same, worse; self-care, school, work, interactions)     Hard to work, boss came by to talk to him about project he was frozen in chair, had diarrhea after  8. SUPPORT: \"Who is with you now?\" \"Who do you live with?\" \"Do you have family or friends nearby who you can talk to?\"      He lives with girlfriend, she is helpful  9. THERAPIST: \"Do you have a counselor or therapist? Name?\"      No, has no mental health coverage through his insurance and too many bills with back issues  10. STRESSORS: \"Has there been any new stress or recent changes in your life?\"        Pretty stressed out about cortisone shot he is to get on Friday, this is for the neck pain, and " "doesn't know if he can go through with this, MRI was very stressful for him when he had this   11. CAFFEINE ABUSE: \"Do you drink caffeinated beverages, and how much each day?\" (e.g., coffee, tea, susannah)        No coffee or tea but he does drink energy drinks until recently but he noticed that made it worse, it has been 6 weeks since having any energy drinks, a few diet cokes   12. SUBSTANCE ABUSE: \"Do you use any illegal drugs or alcohol?\"       Denies drug use, denies alcohol use but 1-2 beers a year. Was taking cradum for pain management (herbal remedy) but no longer  13. OTHER SYMPTOMS: \"Do you have any other physical symptoms right now?\" (e.g., chest pain, palpitations, difficulty breathing, fever)       Not now but at work he had racing heart rate . Denies fever or shortness of breath , has had some chest pain , super mild, not now    Protocols used: ANXIETY AND PANIC ATTACK-A-OH    "

## 2021-06-08 NOTE — TELEPHONE ENCOUNTER
Please call patient.    I have sent a small supply of Ativan to use for panic attack in place of hydroxyzine. This medication should not be taken with alcohol or other sedating medications as misuse can cause respiratory suppression, altered mental status, death. He should not drive while using this medication. This medication also has the potential to be addicting. He needs to keep his follow up with me. This medication is not a long term solution. It is a rescue medication until he can get in with therapy. I am also referring him to psychiatry. I agree that he needs to follow up with therapy referral.     Thank you.    Vashti Rodriguez MD  [facility]  [phone]

## 2021-06-08 NOTE — TELEPHONE ENCOUNTER
Dr Rodriguez    Hydroxyzine not helping pt at all.   It was explained to pt that fluoxetine may take longer to work, just started  He is feeling terrible, constant anxiety. See triage notes for detail  This RN stressed need for the mental health appt and he says he has no coverage. I advised he call his insurance directly, call the [facility] as well as they may have ideas for him (he states he has that number), he was given [facility] number and he was advised to call them for more ideas.  I offered him sooner appt with you but he declined as he doesn't know what can be done for him    What do you advise? Any other ideas?    Emily Hamilton RN            PT was seen on 6/2/21 by PCP. He has appt again with Dr Rodriguez on 6/30. Copied below are her notes from 6/2 visit     Panic attack  Plan as above.  - hydrOXYzine (ATARAX) 25 MG tablet  Dispense: 60 tablet; Refill: 0  - MENTAL HEALTH REFERRAL  - Adult; Outpatient Treatment; Individual/Couples/Family/Group Therapy/Health Psychology; Other: [facility] [phone]; We will contact you to schedule the appointment or please call with any questions       See triage protocol.     He has been taking hydroxyzine , even taking 2 at a time and it doesn't do anything.  He did start the duloxetine. Only 6 days since started this, he feels like it is helping the nerve pain but not the anxiety    He got a call from [facility] but didn't call them back. I have asked he call them again, he is going to check with his insurance

## 2021-06-10 ENCOUNTER — ANCILLARY PROCEDURE (OUTPATIENT)
Dept: GENERAL RADIOLOGY | Facility: CLINIC | Age: 36
End: 2021-06-10
Attending: ORTHOPAEDIC SURGERY
Payer: COMMERCIAL

## 2021-06-10 ENCOUNTER — OFFICE VISIT (OUTPATIENT)
Dept: ORTHOPEDICS | Facility: CLINIC | Age: 36
End: 2021-06-10
Attending: STUDENT IN AN ORGANIZED HEALTH CARE EDUCATION/TRAINING PROGRAM
Payer: COMMERCIAL

## 2021-06-10 DIAGNOSIS — M54.50 CHRONIC BILATERAL LOW BACK PAIN WITHOUT SCIATICA: ICD-10-CM

## 2021-06-10 DIAGNOSIS — G89.29 CHRONIC BILATERAL LOW BACK PAIN WITHOUT SCIATICA: ICD-10-CM

## 2021-06-10 DIAGNOSIS — M54.6 CHRONIC THORACIC BACK PAIN, UNSPECIFIED BACK PAIN LATERALITY: ICD-10-CM

## 2021-06-10 DIAGNOSIS — M54.2 NECK PAIN: ICD-10-CM

## 2021-06-10 DIAGNOSIS — M54.2 CHRONIC NECK PAIN: Primary | ICD-10-CM

## 2021-06-10 DIAGNOSIS — G89.29 CHRONIC NECK PAIN: Primary | ICD-10-CM

## 2021-06-10 DIAGNOSIS — G89.29 CHRONIC THORACIC BACK PAIN, UNSPECIFIED BACK PAIN LATERALITY: Primary | ICD-10-CM

## 2021-06-10 DIAGNOSIS — M54.2 NECK PAIN: Primary | ICD-10-CM

## 2021-06-10 DIAGNOSIS — M54.6 CHRONIC THORACIC BACK PAIN, UNSPECIFIED BACK PAIN LATERALITY: Primary | ICD-10-CM

## 2021-06-10 DIAGNOSIS — G89.29 CHRONIC THORACIC BACK PAIN, UNSPECIFIED BACK PAIN LATERALITY: ICD-10-CM

## 2021-06-10 PROCEDURE — 72040 X-RAY EXAM NECK SPINE 2-3 VW: CPT | Mod: GC | Performed by: RADIOLOGY

## 2021-06-10 PROCEDURE — 99203 OFFICE O/P NEW LOW 30 MIN: CPT | Performed by: ORTHOPAEDIC SURGERY

## 2021-06-10 RX ORDER — DIAZEPAM 5 MG
TABLET ORAL
Qty: 2 TABLET | Refills: 0 | Status: SHIPPED | OUTPATIENT
Start: 2021-06-10 | End: 2021-07-28

## 2021-06-10 NOTE — PROGRESS NOTES
In-Person Visit    REASON FOR CONSULTATION: Consult (Neck/ Upper back pain )     REFERRING PHYSICIAN: Vashti Rodriguez   PCP:Debbie Mckeon    History of Present Illness:  35/m, R handed, referred by PCP (Dr. Rodriguez) for cervical spine evaluation.  He has had neck pain for many years for which she has undergone chiropractor treatment.  Over the last 3 years it has gotten significantly worse.  He started having pain over the left trapezius and deltoid and that is his most significant pain now.  The pain can migrate and moved to his jaw and the right shoulder as well.  He had a history of a rotator cuff tear on the right but has not had his left shoulder evaluated.  He also complains of some lower thoracic pain which can radiate out into the ribs.  He c/o chronic LBP after an injury at age 17, and has re-injured this area many times. This worsens with sitting and by the end of the day it feels like there is a knife stabbing his back. He c/o shooting pain in the bottom of his feet, no radiculopathy.  He relays that the pain can get up to the point where he feels like he is about to pass out.  This is increased his anxiety and panic attacks and makes it very difficult for him to work and function. He has been referred to mental health for his anxiety.     Neck 75%, Arm 25%,  Left > Right  Worse:  sitting  Better:  Temporary relief with chiropractor    Previous treament:   Chiropractor for many years, recently stopped as it was only giving him 2 hours of relief.  He has done exercises through his chiropractor but has not formally seen physical therapy.  He recently started duloxetine which has helped with his pain and anxiety.    Neck Disability Index (NDI) Questionnaire    Neck Disability Index (NDI) 6/10/2021   Neck Disability Index: Count 10   NDI: Total Score = SUM (points for all 10 findings) 34   Neck Disability in Percent = (Total Score) / 50 * 100 68 (%)   Some recent data might be hidden      Visual  Analog Pain Scale  Neck Pain Scale 0-10: 3  Right arm pain: 0  Left arm pain: 0    PROMIS-10 Scores  Global Mental Health Score: (P) 6  Global Physical Health Score: (P) 12  PROMIS TOTAL - SUBSCORES: (P) 18    ROS: A 12-point review of systems was completed and is negative except for otherwise noted above in the history of present illness.    Med Hx:  Past Medical History:   Diagnosis Date     Abdominal pain, unspecified site        Surg Hx:  Past Surgical History:   Procedure Laterality Date     OPTICAL TRACKING SYSTEM ENDOSCOPIC RESECTION TUMOR CRANIAL N/A 4/14/2017    Procedure: OPTICAL TRACKING SYSTEM ENDOSCOPIC RESECTION TUMOR CRANIAL;  Surgeon: Emily Person MD;  Location:  OR       Allergies:  No Known Allergies    Meds:  Current Outpatient Medications   Medication     cetirizine (ZYRTEC) 10 MG tablet     DULoxetine (CYMBALTA) 30 MG capsule     Laura, Zingiber officinalis, (LAURA ROOT) 550 MG CAPS capsule     LORazepam (ATIVAN) 0.5 MG tablet     naproxen sodium 220 MG capsule     hydrOXYzine (ATARAX) 25 MG tablet     No current facility-administered medications for this visit.        Fam Hx:  Family History   Problem Relation Age of Onset     Cardiovascular Maternal Grandmother         COPD     Blood Disease Maternal Grandfather         Blood clot       P/S Hx:  Social History     Tobacco Use     Smoking status: Former Smoker     Smokeless tobacco: Current User     Types: Snuff, Chew   Substance Use Topics     Alcohol use: Not Currently     Alcohol/week: 0.8 standard drinks     Types: 1 Standard drinks or equivalent per week     Comment: rarely         Physical Exam:  Very pleasant, healthy appearing, alert, oriented x 3, cooperative.  Normal mood and affect.  Not in cardiorespiratory distress.  There were no vitals taken for this visit.  Normal upright posture.    Normal gait without assistive device.  No antalgia / imbalance.  Able to perform tandem gait with ease.  Negative Romberg's.    Neck: normal  neck posture, able to maintain horizontal gaze.  No deformity, no skin lesions or surgical scars.  Localizes pain at left shoulder  Mild bilateral paraspinal tenderness, mild spasm of the trapezius and left supraspinatus.  Tenderness at approximately T11 and T12 to palpation of the spinous  ROM:   Full painless flexion, extension, R and L rotation limited to 50 degrees bilaterally.  (-) Spurling's bilat.  (-) Lhermitte's.     Neuro Exam:  5/5 deltoids, 5/5 biceps, 5/5 triceps, 5/5 wrist extensors, 5/5 wrist flexors, 5/5 interossei, 5/5 . 5/5 iliopsoas, 5/5 quadriceps, 5/5 hamstrings, 5/5 anterior tibialis, 5/5 extensor hallucis longus, 5/5 gastrocnemius.    Sensory:  Intact to light touch in both UE's.   Reflexes:      Biceps 1+ bilat.      Brachioradialis 1+ bilat.      Triceps 1+ bilat.    (-) Silvestre bilat.    Patella 0.    Upper extremity:  Left AC joint painful, decreased ROM. Shabbir's test positive.        Imaging:   Cervical MRI from 5/14/2021 reviewed.  Shows diffuse degenerative changes.  Central disc protrusions at C5-6 and C6-7 cause moderate canal stenosis at these levels.  No spinal cord deformation; no cord signal change or myelomalacia.    AP/lat cervical XR 6/10/2021   No significant disc height loss. No malalignemnt. T1 slope 27 deg, C2-T1 lord 12 deg, C2-C7 SVA +2.0cm.    Impression:   34 yo M with chronic neck, thoracic, low back pain. No central nor foraminal stenosis on cervical MRI.  Left shoulder pain     Plan:   - Referral to ortho sports clinic for left shoulder pain, suspect this is the main etiology of his pain.  - Referral to pain clinic for comprehensive pain management.   - Will order thoracic and lumbar MRI, call with results.  - Proceed with cervical KAREN as planned for tomorrow.    We discussed that we do not appreciate any surgical pathology in his cervical spine. As a last resort, one might consider C5-C6, C6-C7 anterior cervical discectomy and fusion, but it is unclear if this  would actually help with his pain and these bulges are quite mild and he does not have any significant nerve compression.  Thus, we do not recommend surgical treatment for his condition at this point, as the likelihood of benefit is unclear.    All questions and concerns were answered to the patient's apparent satisfaction before leaving the clinic.     Respectfully,    Naye Arora PA-C   Orthopaedic Spine Surgery    Attestation:  I (Dr. Jorge Rain - Spine Surgeon) have personally evaluated patient with MARGARITA Arora, and agree with findings and plan outlined in the note, which I also edited.  I discussed at length with the patient/family, explained the nature of spinal condition, and formulated workup and/or treatment plan together.  All questions were answered to the best of my ability and to patient's apparent satisfaction.

## 2021-06-10 NOTE — LETTER
6/10/2021         RE: Khadar Weems  [address]  [facility] [address]        Dear Colleague,    Thank you for referring your patient, Khadar Weems, to the [facility] [place]. Please see a copy of my visit note below.    In-Person Visit    REASON FOR CONSULTATION: Consult (Neck/ Upper back pain )     REFERRING PHYSICIAN: Vashti Rodriguez   PCP:Debbie Mckeon    History of Present Illness:  35/m, R handed, referred by PCP (Dr. Rodriguez) for cervical spine evaluation.  He has had neck pain for many years for which she has undergone chiropractor treatment.  Over the last 3 years it has gotten significantly worse.  He started having pain over the left trapezius and deltoid and that is his most significant pain now.  The pain can migrate and moved to his jaw and the right shoulder as well.  He had a history of a rotator cuff tear on the right but has not had his left shoulder evaluated.  He also complains of some lower thoracic pain which can radiate out into the ribs.  He c/o chronic LBP after an injury at age 17, and has re-injured this area many times. This worsens with sitting and by the end of the day it feels like there is a knife stabbing his back. He c/o shooting pain in the bottom of his feet, no radiculopathy.  He relays that the pain can get up to the point where he feels like he is about to pass out.  This is increased his anxiety and panic attacks and makes it very difficult for him to work and function. He has been referred to mental health for his anxiety.     Neck 75%, Arm 25%,  Left > Right  Worse:  sitting  Better:  Temporary relief with chiropractor    Previous treament:   Chiropractor for many years, recently stopped as it was only giving him 2 hours of relief.  He has done exercises through his chiropractor but has not formally seen physical therapy.  He recently started duloxetine which has helped with his pain and anxiety.    Neck Disability  Index (NDI) Questionnaire    Neck Disability Index (NDI) 6/10/2021   Neck Disability Index: Count 10   NDI: Total Score = SUM (points for all 10 findings) 34   Neck Disability in Percent = (Total Score) / 50 * 100 68 (%)   Some recent data might be hidden      Visual Analog Pain Scale  Neck Pain Scale 0-10: 3  Right arm pain: 0  Left arm pain: 0    PROMIS-10 Scores  Global Mental Health Score: (P) 6  Global Physical Health Score: (P) 12  PROMIS TOTAL - SUBSCORES: (P) 18    ROS: A 12-point review of systems was completed and is negative except for otherwise noted above in the history of present illness.    Med Hx:  Past Medical History:   Diagnosis Date     Abdominal pain, unspecified site        Surg Hx:  Past Surgical History:   Procedure Laterality Date     OPTICAL TRACKING SYSTEM ENDOSCOPIC RESECTION TUMOR CRANIAL N/A 4/14/2017    Procedure: OPTICAL TRACKING SYSTEM ENDOSCOPIC RESECTION TUMOR CRANIAL;  Surgeon: Emily Person MD;  Location:  OR       Allergies:  No Known Allergies    Meds:  Current Outpatient Medications   Medication     cetirizine (ZYRTEC) 10 MG tablet     DULoxetine (CYMBALTA) 30 MG capsule     Laura, Zingiber officinalis, (LAURA ROOT) 550 MG CAPS capsule     LORazepam (ATIVAN) 0.5 MG tablet     naproxen sodium 220 MG capsule     hydrOXYzine (ATARAX) 25 MG tablet     No current facility-administered medications for this visit.        Fam Hx:  Family History   Problem Relation Age of Onset     Cardiovascular Maternal Grandmother         COPD     Blood Disease Maternal Grandfather         Blood clot       P/S Hx:  Social History     Tobacco Use     Smoking status: Former Smoker     Smokeless tobacco: Current User     Types: Snuff, Chew   Substance Use Topics     Alcohol use: Not Currently     Alcohol/week: 0.8 standard drinks     Types: 1 Standard drinks or equivalent per week     Comment: rarely         Physical Exam:  Very pleasant, healthy appearing, alert, oriented x 3, cooperative.   Normal mood and affect.  Not in cardiorespiratory distress.  There were no vitals taken for this visit.  Normal upright posture.    Normal gait without assistive device.  No antalgia / imbalance.  Able to perform tandem gait with ease.  Negative Romberg's.    Neck: normal neck posture, able to maintain horizontal gaze.  No deformity, no skin lesions or surgical scars.  Localizes pain at left shoulder  Mild bilateral paraspinal tenderness, mild spasm of the trapezius and left supraspinatus.  Tenderness at approximately T11 and T12 to palpation of the spinous  ROM:   Full painless flexion, extension, R and L rotation limited to 50 degrees bilaterally.  (-) Spurling's bilat.  (-) Lhermitte's.     Neuro Exam:  5/5 deltoids, 5/5 biceps, 5/5 triceps, 5/5 wrist extensors, 5/5 wrist flexors, 5/5 interossei, 5/5 . 5/5 iliopsoas, 5/5 quadriceps, 5/5 hamstrings, 5/5 anterior tibialis, 5/5 extensor hallucis longus, 5/5 gastrocnemius.    Sensory:  Intact to light touch in both UE's.   Reflexes:      Biceps 1+ bilat.      Brachioradialis 1+ bilat.      Triceps 1+ bilat.    (-) Silvestre bilat.    Patella 0.    Upper extremity:  Left AC joint painful, decreased ROM. Shabbir's test positive.        Imaging:   Cervical MRI from 5/14/2021 reviewed.  Shows diffuse degenerative changes.  Central disc protrusions at C5-6 and C6-7 cause moderate canal stenosis at these levels.  No spinal cord deformation; no cord signal change or myelomalacia.    AP/lat cervical XR 6/10/2021   No significant disc height loss. No malalignemnt. T1 slope 27 deg, C2-T1 lord 12 deg, C2-C7 SVA +2.0cm.    Impression:   36 yo M with chronic neck, thoracic, low back pain. No central nor foraminal stenosis on cervical MRI.  Left shoulder pain     Plan:   - Referral to ortho sports clinic for left shoulder pain, suspect this is the main etiology of his pain.  - Referral to pain clinic for comprehensive pain management.   - Will order thoracic and lumbar MRI, call  with results.  - Proceed with cervical KAREN as planned for tomorrow.    We discussed that we do not appreciate any surgical pathology in his cervical spine. As a last resort, one might consider C5-C6, C6-C7 anterior cervical discectomy and fusion, but it is unclear if this would actually help with his pain and these bulges are quite mild and he does not have any significant nerve compression.  Thus, we do not recommend surgical treatment for his condition at this point, as the likelihood of benefit is unclear.    All questions and concerns were answered to the patient's apparent satisfaction before leaving the clinic.     Respectfully,    Naye Arora PA-C   Orthopaedic Spine Surgery    Attestation:  I (Dr. Jorge Rain - Spine Surgeon) have personally evaluated patient with MARGARITA Arora, and agree with findings and plan outlined in the note, which I also edited.  I discussed at length with the patient/family, explained the nature of spinal condition, and formulated workup and/or treatment plan together.  All questions were answered to the best of my ability and to patient's apparent satisfaction.

## 2021-06-14 NOTE — TELEPHONE ENCOUNTER
DIAGNOSIS:    APPOINTMENT DATE: 6.21.21   NOTES STATUS DETAILS   OFFICE NOTE from referring provider N/A    OFFICE NOTE from other specialist Internal 6.10.21 Dr Jorge Rain, [facility]   DISCHARGE SUMMARY from hospital N/A    DISCHARGE REPORT from the ER N/A    OPERATIVE REPORT N/A    EMG report N/A    MEDICATION LIST Internal    MRI Received 5.14.21 cervical spine, [facility]   DEXA (osteoporosis/bone health) N/A    CT SCAN N/A    XRAYS (IMAGES & REPORTS) Internal 6.10.21 cervical spine     Action 6.14.21 9:23 AM EMELY   Action Taken Called to get [facility] report. Sent to scan

## 2021-06-16 ENCOUNTER — OFFICE VISIT (OUTPATIENT)
Dept: FAMILY MEDICINE | Facility: CLINIC | Age: 36
End: 2021-06-16
Payer: COMMERCIAL

## 2021-06-16 VITALS
TEMPERATURE: 98.3 F | SYSTOLIC BLOOD PRESSURE: 138 MMHG | HEART RATE: 77 BPM | DIASTOLIC BLOOD PRESSURE: 93 MMHG | OXYGEN SATURATION: 96 %

## 2021-06-16 DIAGNOSIS — F06.4 ANXIETY DISORDER DUE TO KNOWN PHYSIOLOGICAL CONDITION: ICD-10-CM

## 2021-06-16 DIAGNOSIS — F19.10 SUBSTANCE ABUSE (H): ICD-10-CM

## 2021-06-16 DIAGNOSIS — G89.29 CHRONIC NECK PAIN: ICD-10-CM

## 2021-06-16 DIAGNOSIS — G89.29 CHRONIC BACK PAIN, UNSPECIFIED BACK LOCATION, UNSPECIFIED BACK PAIN LATERALITY: ICD-10-CM

## 2021-06-16 DIAGNOSIS — F41.1 GAD (GENERALIZED ANXIETY DISORDER): ICD-10-CM

## 2021-06-16 DIAGNOSIS — G89.29 CHRONIC THORACIC BACK PAIN, UNSPECIFIED BACK PAIN LATERALITY: ICD-10-CM

## 2021-06-16 DIAGNOSIS — M54.6 CHRONIC THORACIC BACK PAIN, UNSPECIFIED BACK PAIN LATERALITY: ICD-10-CM

## 2021-06-16 DIAGNOSIS — M54.9 CHRONIC BACK PAIN, UNSPECIFIED BACK LOCATION, UNSPECIFIED BACK PAIN LATERALITY: ICD-10-CM

## 2021-06-16 DIAGNOSIS — M54.2 CHRONIC NECK PAIN: ICD-10-CM

## 2021-06-16 DIAGNOSIS — M50.30 DDD (DEGENERATIVE DISC DISEASE), CERVICAL: ICD-10-CM

## 2021-06-16 DIAGNOSIS — F41.0 PANIC ATTACK: Primary | ICD-10-CM

## 2021-06-16 PROCEDURE — 99214 OFFICE O/P EST MOD 30 MIN: CPT | Performed by: STUDENT IN AN ORGANIZED HEALTH CARE EDUCATION/TRAINING PROGRAM

## 2021-06-16 RX ORDER — DULOXETIN HYDROCHLORIDE 60 MG/1
60 CAPSULE, DELAYED RELEASE ORAL DAILY
Qty: 60 CAPSULE | Refills: 1 | Status: SHIPPED | OUTPATIENT
Start: 2021-06-16 | End: 2021-06-18

## 2021-06-16 RX ORDER — PROPRANOLOL HYDROCHLORIDE 10 MG/1
10 TABLET ORAL 3 TIMES DAILY PRN
Qty: 30 TABLET | Refills: 1 | Status: SHIPPED | OUTPATIENT
Start: 2021-06-16

## 2021-06-16 ASSESSMENT — ANXIETY QUESTIONNAIRES
1. FEELING NERVOUS, ANXIOUS, OR ON EDGE: MORE THAN HALF THE DAYS
6. BECOMING EASILY ANNOYED OR IRRITABLE: SEVERAL DAYS
4. TROUBLE RELAXING: MORE THAN HALF THE DAYS
7. FEELING AFRAID AS IF SOMETHING AWFUL MIGHT HAPPEN: SEVERAL DAYS
GAD7 TOTAL SCORE: 11
7. FEELING AFRAID AS IF SOMETHING AWFUL MIGHT HAPPEN: SEVERAL DAYS
2. NOT BEING ABLE TO STOP OR CONTROL WORRYING: MORE THAN HALF THE DAYS
5. BEING SO RESTLESS THAT IT IS HARD TO SIT STILL: SEVERAL DAYS
GAD7 TOTAL SCORE: 11
3. WORRYING TOO MUCH ABOUT DIFFERENT THINGS: MORE THAN HALF THE DAYS
GAD7 TOTAL SCORE: 11

## 2021-06-16 ASSESSMENT — PATIENT HEALTH QUESTIONNAIRE - PHQ9
10. IF YOU CHECKED OFF ANY PROBLEMS, HOW DIFFICULT HAVE THESE PROBLEMS MADE IT FOR YOU TO DO YOUR WORK, TAKE CARE OF THINGS AT HOME, OR GET ALONG WITH OTHER PEOPLE: SOMEWHAT DIFFICULT
SUM OF ALL RESPONSES TO PHQ QUESTIONS 1-9: 10
SUM OF ALL RESPONSES TO PHQ QUESTIONS 1-9: 10

## 2021-06-16 NOTE — PROGRESS NOTES
Assessment & Plan     1. Panic attack  Discussed that lorazepam is a short term medication and I would like to avoid long term use, especially given addiction history. He has had some mild improvement since starting Cymbalta and noticed improvement in anxiety with suboxone, although just started treatment. Will try propranolol to use as needed instead. Free mental health clinic resources provided today. I strongly encouraged him to seek out care to discuss ways to cope with anxiety, panic. Follow up in 1 month.   - propranolol (INDERAL) 10 MG tablet; Take 1 tablet (10 mg) by mouth 3 times daily as needed (anxiety)  Dispense: 30 tablet; Refill: 1    2. Chronic thoracic back pain, unspecified back pain laterality  3. Chronic neck pain  Established care with orthopedics. Cervical neck injection done last week with mild improvement in pain. He has additional follow up next week. Pain clinic consultation scheduled in July. He will have MRIs obtained of his thoracic and lumbar spine in addition soon.     4. Anxiety disorder due to known physiological condition  Plan as above.    5. Substance abuse (H)  Receiving care through [facility] addiction medicine--established with provider yesterday. Now on Suboxone.     6. PATEL (generalized anxiety disorder)  As above.  - DULoxetine (CYMBALTA) 60 MG capsule; Take 1 capsule (60 mg) by mouth daily For 1 week then increase to 2 tablets (60 mg) daily.  Dispense: 60 capsule; Refill: 1    7. DDD (degenerative disc disease), cervical  As above.    8. Chronic back pain, unspecified back location, unspecified back pain laterality    Vashti Rodriguez MD  [facility]    Constantino Rubalcava is a 35 year old who presents for the following health issues    HPI     He is here to follow up from our visit 2 weeks ago for anxiety, chronic pain.    Since then, he has established care with orthopedics, addiction medicine. Has a visit coming up on July 23rd for pain clinic.      Went to [facility] in [place] yesterday to discuss addiction issues due to history of narcotic abuse, Kratom use for pain. Started suboxone yesterday. States that after his first dose, he had the best day he had in a year in regards to anxiety. He had a history of opiate use including pills and was in treatment initially in his teens, also methamphetamine use. Had been using Kratom in recent months to get off pain pills. He will follow up with addiction medicine on 6/22.     He has been taking Cymbalta. Has just started to notice a small improvement in anxiety in that it is preventing him from progressing to a full blown anxiety attack. Has noted some difficulty completing ejaculation s only side effect. Denies any thoughts of self harm, suicidal ideation.  Still notes some claustrophic symptoms--wearing a mask, getting his blood pressure taken. He had to take Ativan last week when given a cortisone injection in his neck at [facility] and to get him through panic attacks occurring at work. Hydroxyzine had no effect. Told by his insurance that he would not get any insurance coverage for any mental health services.  He is open to free therapy.    Has noted some slight difference in his shoulder pain after the steroid neck injection--can raise his left arm a little higher now. Has an appointment with sports medicine on June 21 to discuss neck and shoulder pain. Also scheduled for additional MRIs of his back.     Sources of anxiety have included increased demands at work, pain, Kratom use. Lives with his girlfriend and best friend. Enjoys hiking, reading. These things have been difficult to due to neck, back spasms. He is hopeful that pain, anxiety will get better with suboxone, pain treatment.     PHQ 6/2/2021 6/16/2021   PHQ-9 Total Score 15 10   Q9: Thoughts of better off dead/self-harm past 2 weeks Not at all Not at all     PATEL-7 SCORE 6/2/2021 6/16/2021   Total Score 19 (severe anxiety) 11 (moderate anxiety)   Total  Score 19 11         Objective    BP (!) 138/93   Pulse 77   Temp 98.3  F (36.8  C)   SpO2 96%   There is no height or weight on file to calculate BMI.  Physical Exam   GENERAL: healthy, alert and no distress  EYES: Eyes grossly normal to inspection, PERRL and conjunctivae and sclerae normal  NEURO: Normal strength and tone, mentation intact and speech normal  PSYCH: mentation appears normal, affect normal

## 2021-06-16 NOTE — PATIENT INSTRUCTIONS
You might want to consider acupuncture and/or myofascial release for back pain.     Prescription sent for propranolol to use as needed. I have refilled Cymbalta.     Please reach out to the free mental health service for counseling.     Follow up for a physical in one month or sooner if needed.

## 2021-06-17 ASSESSMENT — PATIENT HEALTH QUESTIONNAIRE - PHQ9: SUM OF ALL RESPONSES TO PHQ QUESTIONS 1-9: 10

## 2021-06-17 ASSESSMENT — ANXIETY QUESTIONNAIRES: GAD7 TOTAL SCORE: 11

## 2021-06-18 ENCOUNTER — TELEPHONE (OUTPATIENT)
Dept: FAMILY MEDICINE | Facility: CLINIC | Age: 36
End: 2021-06-18
Payer: COMMERCIAL

## 2021-06-18 DIAGNOSIS — F41.1 GAD (GENERALIZED ANXIETY DISORDER): ICD-10-CM

## 2021-06-18 NOTE — TELEPHONE ENCOUNTER
I'm not sure what Dr. Rodriguez's intention was with this - 30 mg to 60 or 60 mg to 120.    Will send to her; she returns 6/19.    Dr. Marbella Teresa MD / [facility]

## 2021-06-18 NOTE — TELEPHONE ENCOUNTER
Sig clarification for DULoxetine (CYMBALTA) 60 MG capsule.    Current sig: Take 1 capsule (60 mg) by mouth daily For 1 week then increase to 2 tablets (60 mg) daily.  If pt is going to take 2 tabs, it would be 120 mg.  Please clarify which one it should be.

## 2021-06-21 ENCOUNTER — OFFICE VISIT (OUTPATIENT)
Dept: ORTHOPEDICS | Facility: CLINIC | Age: 36
End: 2021-06-21
Attending: PHYSICIAN ASSISTANT
Payer: COMMERCIAL

## 2021-06-21 ENCOUNTER — ANCILLARY PROCEDURE (OUTPATIENT)
Dept: GENERAL RADIOLOGY | Facility: CLINIC | Age: 36
End: 2021-06-21
Attending: FAMILY MEDICINE
Payer: COMMERCIAL

## 2021-06-21 ENCOUNTER — PRE VISIT (OUTPATIENT)
Dept: ORTHOPEDICS | Facility: CLINIC | Age: 36
End: 2021-06-21

## 2021-06-21 VITALS — BODY MASS INDEX: 36.03 KG/M2 | WEIGHT: 266 LBS | HEIGHT: 72 IN

## 2021-06-21 DIAGNOSIS — G89.29 CHRONIC BILATERAL LOW BACK PAIN WITHOUT SCIATICA: ICD-10-CM

## 2021-06-21 DIAGNOSIS — G89.29 CHRONIC LEFT SHOULDER PAIN: Primary | ICD-10-CM

## 2021-06-21 DIAGNOSIS — G89.29 CHRONIC NECK PAIN: ICD-10-CM

## 2021-06-21 DIAGNOSIS — M25.512 CHRONIC LEFT SHOULDER PAIN: ICD-10-CM

## 2021-06-21 DIAGNOSIS — M25.512 CHRONIC LEFT SHOULDER PAIN: Primary | ICD-10-CM

## 2021-06-21 DIAGNOSIS — M54.2 CHRONIC NECK PAIN: ICD-10-CM

## 2021-06-21 DIAGNOSIS — M54.50 CHRONIC BILATERAL LOW BACK PAIN WITHOUT SCIATICA: ICD-10-CM

## 2021-06-21 DIAGNOSIS — M54.6 CHRONIC THORACIC BACK PAIN, UNSPECIFIED BACK PAIN LATERALITY: ICD-10-CM

## 2021-06-21 DIAGNOSIS — G89.29 CHRONIC THORACIC BACK PAIN, UNSPECIFIED BACK PAIN LATERALITY: ICD-10-CM

## 2021-06-21 DIAGNOSIS — G89.29 CHRONIC LEFT SHOULDER PAIN: ICD-10-CM

## 2021-06-21 PROCEDURE — 73030 X-RAY EXAM OF SHOULDER: CPT | Mod: LT | Performed by: RADIOLOGY

## 2021-06-21 PROCEDURE — 99204 OFFICE O/P NEW MOD 45 MIN: CPT | Performed by: FAMILY MEDICINE

## 2021-06-21 RX ORDER — DIAZEPAM 5 MG
10 TABLET ORAL
Qty: 2 TABLET | Refills: 0 | Status: SHIPPED | OUTPATIENT
Start: 2021-06-21 | End: 2021-07-28

## 2021-06-21 RX ORDER — DULOXETIN HYDROCHLORIDE 60 MG/1
60 CAPSULE, DELAYED RELEASE ORAL DAILY
Qty: 60 CAPSULE | Refills: 1 | Status: SHIPPED | OUTPATIENT
Start: 2021-06-21 | End: 2021-08-13

## 2021-06-21 ASSESSMENT — MIFFLIN-ST. JEOR: SCORE: 2171.63

## 2021-06-21 NOTE — LETTER
6/21/2021      RE: Khadar Weems  [address]  [facility] [address]       CHIEF COMPLAINT:  Pain of the Left Shoulder       HISTORY OF PRESENT ILLNESS  Mr. Weems is a pleasant 35 year old year old male who presents to clinic today with L shoulder pain.      Khadar explains that he started having L shoulder pain about 10 years ago around when he had R shoulder surgery.  Over last 5 years, L shoulder pain has been getting worse. Last 9 months have been significantly worse.    Anterior pain along pectoral muscle. Also near AC joint moving posteriorly.    Onset: gradual  Location: left shoulder  Quality:  sharp and shooting  Duration: 9 months is when Sx significantly got worse  Severity: 5/10 at worst  Timing:intermittent episodes can fluctuate with position and activity  Modifying factors:  resting and non-use makes it better, movement and use makes it worse  Associated signs & symptoms: pain and tenderness  Previous similar pain: Yes, chronic for last 5 years  Treatments to date:HEP, soft tissue mobilization in the past with chiro    Additional history: as documented    Review of Systems:    Have you recently had a a fever, chills, weight loss? Yes, intentional weight loss    Do you have any vision problems? No    Do you have any chest pain or edema? No    Do you have any shortness of breath or wheezing?  Yes, related to anxiety attacks    Do you have stomach problems? No    Do you have any numbness or focal weakness? Yes, thoracic spine paresthesia     Do you have diabetes? No    Do you have problems with bleeding or clotting? No (family Hx of grandfather with clotting disorder)    Do you have an rashes or other skin lesions? Yes, psoriasis     MEDICAL HISTORY  Patient Active Problem List   Diagnosis     CARDIOVASCULAR SCREENING; LDL GOAL LESS THAN 160     Sensation of fullness in ear     Recurrent boils     Abdominal pain, unspecified abdominal location     Leg length discrepancy     Elevated blood  "pressure reading without diagnosis of hypertension     CSF leak     Substance abuse (H)     PATEL (generalized anxiety disorder)     Chronic neck pain     DDD (degenerative disc disease), cervical     Chronic back pain, unspecified back location, unspecified back pain laterality       Current Outpatient Medications   Medication Sig Dispense Refill     cetirizine (ZYRTEC) 10 MG tablet Take 10 mg by mouth daily Reported on 5/4/2017       diazepam (VALIUM) 5 MG tablet Take 1 Tab as directed by MRI staff.  May repeat once. 2 tablet 0     DULoxetine (CYMBALTA) 60 MG capsule Take 1 capsule (60 mg) by mouth daily 60 capsule 1     Ginger, Zingiber officinalis, (GINGER ROOT) 550 MG CAPS capsule Take 550 mg by mouth daily Reported on 5/4/2017       hydrOXYzine (ATARAX) 25 MG tablet Take 1-2 tablets (25-50 mg) by mouth every 8 hours as needed for itching or anxiety (Patient not taking: Reported on 6/10/2021) 60 tablet 0     naproxen sodium 220 MG capsule Take 220 mg by mouth 2 times daily (with meals)       propranolol (INDERAL) 10 MG tablet Take 1 tablet (10 mg) by mouth 3 times daily as needed (anxiety) 30 tablet 1       No Known Allergies    Family History   Problem Relation Age of Onset     Cardiovascular Maternal Grandmother         COPD     Blood Disease Maternal Grandfather         Blood clot       Additional medical/Social/Surgical histories reviewed in [facility] and updated as appropriate.       PHYSICAL EXAM  Ht 1.816 m (5' 11.5\")   Wt 120.7 kg (266 lb)   BMI 36.58 kg/m      General  - normal appearance, in no obvious distress  HEENT  - conjunctivae not injected, moist mucous membranes  CV  - normal radial pulse  Pulm  - normal respiratory pattern, non-labored  Musculoskeletal - R shoulder  - inspection: normal bone and joint alignment, no obvious deformity, no scapular winging, no AC step-off  - palpation: no bony or soft tissue tenderness, normal clavicle, non-tender AC  - ROM:  limited flexion, IR, ER, abduction, " painful at end range  - strength: 5/5  strength, 5/5 in all shoulder planes  - special tests:  (-) Speed's  (-) Neer  (-) Hawkin's  (-) Shabbir's  (+) Harris's  (-) apprehension  (-) subscap lift-off  Neuro  - no sensory or motor deficit, grossly normal coordination, normal muscle tone  Skin  - no ecchymosis, erythema, warmth, or induration, no obvious rash  Psych  - interactive, appropriate, normal mood and affect    IMAGING : XR left shoulder 4V. Final results and radiologist's interpretation, available in the [facility] health record. Images were reviewed with the patient/family members in the office today. My personal interpretation of the performed imaging is no acute osseous abnormality or significant degenerative changes of joint.     ASSESSMENT & PLAN  Mr. Weems is a 35 year old year old male who presents to clinic today with chronic left shoulder pain and cervicalgia.  Recent cervical epidural injection did not provide significant relief to the left shoulder and continues to have restricted range of motion and pain.  He has a history of a right glenohumeral labrum tear and a do of some suspicion that he may have intra-articular pathology on the left.  No improvement with labral specific HEP.    Diagnosis:   1.  Chronic cervicalgia  2.  Chronic pain of left shoulder  3.  History of labrum tear of right shoulder status post arthroscopic repair    MRI of left shoulder ordered.  Would like him to continue care with Dr. Rain for his cervical region, thoracic and lumbar spine.  He will also follow-up with pain management regarding these areas.    I will see him back after his left shoulder MRI we will discuss plan thereafter.    It was a pleasure seeing Khadar today.    Jos Toscano DO, [facility]  Primary Care Sports Medicine        Jos Toscano DO

## 2021-06-21 NOTE — PROGRESS NOTES
CHIEF COMPLAINT:  Pain of the Left Shoulder       HISTORY OF PRESENT ILLNESS  Mr. Weems is a pleasant 35 year old year old male who presents to clinic today with L shoulder pain.      Khadar explains that he started having L shoulder pain about 10 years ago around when he had R shoulder surgery.  Over last 5 years, L shoulder pain has been getting worse. Last 9 months have been significantly worse.    Anterior pain along pectoral muscle. Also near AC joint moving posteriorly.    Onset: gradual  Location: left shoulder  Quality:  sharp and shooting  Duration: 9 months is when Sx significantly got worse  Severity: 5/10 at worst  Timing:intermittent episodes can fluctuate with position and activity  Modifying factors:  resting and non-use makes it better, movement and use makes it worse  Associated signs & symptoms: pain and tenderness  Previous similar pain: Yes, chronic for last 5 years  Treatments to date:HEP, soft tissue mobilization in the past with chiro    Additional history: as documented    Review of Systems:    Have you recently had a a fever, chills, weight loss? Yes, intentional weight loss    Do you have any vision problems? No    Do you have any chest pain or edema? No    Do you have any shortness of breath or wheezing?  Yes, related to anxiety attacks    Do you have stomach problems? No    Do you have any numbness or focal weakness? Yes, thoracic spine paresthesia     Do you have diabetes? No    Do you have problems with bleeding or clotting? No (family Hx of grandfather with clotting disorder)    Do you have an rashes or other skin lesions? Yes, psoriasis     MEDICAL HISTORY  Patient Active Problem List   Diagnosis     CARDIOVASCULAR SCREENING; LDL GOAL LESS THAN 160     Sensation of fullness in ear     Recurrent boils     Abdominal pain, unspecified abdominal location     Leg length discrepancy     Elevated blood pressure reading without diagnosis of hypertension     CSF leak     Substance abuse (H)  "    PATEL (generalized anxiety disorder)     Chronic neck pain     DDD (degenerative disc disease), cervical     Chronic back pain, unspecified back location, unspecified back pain laterality       Current Outpatient Medications   Medication Sig Dispense Refill     cetirizine (ZYRTEC) 10 MG tablet Take 10 mg by mouth daily Reported on 5/4/2017       diazepam (VALIUM) 5 MG tablet Take 1 Tab as directed by MRI staff.  May repeat once. 2 tablet 0     DULoxetine (CYMBALTA) 60 MG capsule Take 1 capsule (60 mg) by mouth daily 60 capsule 1     Ginger, Zingiber officinalis, (GINGER ROOT) 550 MG CAPS capsule Take 550 mg by mouth daily Reported on 5/4/2017       hydrOXYzine (ATARAX) 25 MG tablet Take 1-2 tablets (25-50 mg) by mouth every 8 hours as needed for itching or anxiety (Patient not taking: Reported on 6/10/2021) 60 tablet 0     naproxen sodium 220 MG capsule Take 220 mg by mouth 2 times daily (with meals)       propranolol (INDERAL) 10 MG tablet Take 1 tablet (10 mg) by mouth 3 times daily as needed (anxiety) 30 tablet 1       No Known Allergies    Family History   Problem Relation Age of Onset     Cardiovascular Maternal Grandmother         COPD     Blood Disease Maternal Grandfather         Blood clot       Additional medical/Social/Surgical histories reviewed in [facility] and updated as appropriate.       PHYSICAL EXAM  Ht 1.816 m (5' 11.5\")   Wt 120.7 kg (266 lb)   BMI 36.58 kg/m      General  - normal appearance, in no obvious distress  HEENT  - conjunctivae not injected, moist mucous membranes  CV  - normal radial pulse  Pulm  - normal respiratory pattern, non-labored  Musculoskeletal - R shoulder  - inspection: normal bone and joint alignment, no obvious deformity, no scapular winging, no AC step-off  - palpation: no bony or soft tissue tenderness, normal clavicle, non-tender AC  - ROM:  limited flexion, IR, ER, abduction, painful at end range  - strength: 5/5  strength, 5/5 in all shoulder planes  - special " tests:  (-) Speed's  (-) Neer  (-) Hawkin's  (-) Shabbir's  (+) Hunt's  (-) apprehension  (-) subscap lift-off  Neuro  - no sensory or motor deficit, grossly normal coordination, normal muscle tone  Skin  - no ecchymosis, erythema, warmth, or induration, no obvious rash  Psych  - interactive, appropriate, normal mood and affect    IMAGING : XR left shoulder 4V. Final results and radiologist's interpretation, available in the [facility] health record. Images were reviewed with the patient/family members in the office today. My personal interpretation of the performed imaging is no acute osseous abnormality or significant degenerative changes of joint.     ASSESSMENT & PLAN  Mr. Weems is a 35 year old year old male who presents to clinic today with chronic left shoulder pain and cervicalgia.  Recent cervical epidural injection did not provide significant relief to the left shoulder and continues to have restricted range of motion and pain.  He has a history of a right glenohumeral labrum tear and a do of some suspicion that he may have intra-articular pathology on the left.  No improvement with labral specific HEP.    Diagnosis:   1.  Chronic cervicalgia  2.  Chronic pain of left shoulder  3.  History of labrum tear of right shoulder status post arthroscopic repair    MRI of left shoulder ordered.  Would like him to continue care with Dr. Rain for his cervical region, thoracic and lumbar spine.  He will also follow-up with pain management regarding these areas.    I will see him back after his left shoulder MRI we will discuss plan thereafter.    It was a pleasure seeing Khadar today.    Jos Toscano, , [facility]  Primary Care Sports Medicine

## 2021-07-07 ENCOUNTER — ANCILLARY PROCEDURE (OUTPATIENT)
Dept: MRI IMAGING | Facility: CLINIC | Age: 36
End: 2021-07-07
Attending: PHYSICIAN ASSISTANT
Payer: COMMERCIAL

## 2021-07-07 DIAGNOSIS — G89.29 CHRONIC BILATERAL LOW BACK PAIN WITHOUT SCIATICA: ICD-10-CM

## 2021-07-07 DIAGNOSIS — M54.50 CHRONIC BILATERAL LOW BACK PAIN WITHOUT SCIATICA: ICD-10-CM

## 2021-07-07 DIAGNOSIS — G89.29 CHRONIC THORACIC BACK PAIN, UNSPECIFIED BACK PAIN LATERALITY: ICD-10-CM

## 2021-07-07 DIAGNOSIS — M54.6 CHRONIC THORACIC BACK PAIN, UNSPECIFIED BACK PAIN LATERALITY: ICD-10-CM

## 2021-07-07 DIAGNOSIS — G89.29 CHRONIC NECK PAIN: ICD-10-CM

## 2021-07-07 DIAGNOSIS — M54.2 CHRONIC NECK PAIN: ICD-10-CM

## 2021-07-07 PROCEDURE — 72146 MRI CHEST SPINE W/O DYE: CPT | Performed by: RADIOLOGY

## 2021-07-07 PROCEDURE — 72148 MRI LUMBAR SPINE W/O DYE: CPT | Performed by: RADIOLOGY

## 2021-07-08 ENCOUNTER — OFFICE VISIT (OUTPATIENT)
Dept: ORTHOPEDICS | Facility: CLINIC | Age: 36
End: 2021-07-08
Payer: COMMERCIAL

## 2021-07-08 VITALS — BODY MASS INDEX: 36.03 KG/M2 | WEIGHT: 266 LBS | HEIGHT: 72 IN

## 2021-07-08 DIAGNOSIS — M25.512 CHRONIC LEFT SHOULDER PAIN: ICD-10-CM

## 2021-07-08 DIAGNOSIS — S43.432A SUPERIOR LABRUM ANTERIOR-TO-POSTERIOR (SLAP) TEAR OF LEFT SHOULDER: Primary | ICD-10-CM

## 2021-07-08 DIAGNOSIS — G89.29 CHRONIC LEFT SHOULDER PAIN: ICD-10-CM

## 2021-07-08 PROCEDURE — 99213 OFFICE O/P EST LOW 20 MIN: CPT | Performed by: FAMILY MEDICINE

## 2021-07-08 ASSESSMENT — MIFFLIN-ST. JEOR: SCORE: 2166.63

## 2021-07-08 NOTE — PROGRESS NOTES
ESTABLISHED PATIENT FOLLOW-UP:  RECHECK (Left shoulder MRI)       HISTORY OF PRESENT ILLNESS  Mr. Weems is a pleasant 36 year old year old male who presents to clinic today for follow-up of left shoulder MRI.     Date of injury/onset: 9 months ago  Date last seen: 6/21/21  Following Therapeutic Plan: Yes  Pain: Slightly improved  Function: Unchanged  Interval History: Overall no change since last visit.      Review of Systems:    Do you have fever, chills, weight loss? No    Do you have any vision problems? No    Do you have any chest pain or edema? No    Do you have any shortness of breath or wheezing?  Yes, due to anxiety    Do you have stomach problems? No    Do you have any numbness or focal weakness? Yes, thoracic spine paresthesia    Do you have diabetes? No    Do you have problems with bleeding or clotting? No    Do you have an rashes or other skin lesions? Yes, psoriasis    MEDICAL HISTORY  Patient Active Problem List   Diagnosis     CARDIOVASCULAR SCREENING; LDL GOAL LESS THAN 160     Sensation of fullness in ear     Recurrent boils     Abdominal pain, unspecified abdominal location     Leg length discrepancy     Elevated blood pressure reading without diagnosis of hypertension     CSF leak     Substance abuse (H)     PATEL (generalized anxiety disorder)     Chronic neck pain     DDD (degenerative disc disease), cervical     Chronic back pain, unspecified back location, unspecified back pain laterality       Current Outpatient Medications   Medication Sig Dispense Refill     cetirizine (ZYRTEC) 10 MG tablet Take 10 mg by mouth daily Reported on 5/4/2017       diazepam (VALIUM) 5 MG tablet Take 2 tablets (10 mg) by mouth once as needed for anxiety Pre-procedural anxiolytic for MRI.  Please take 2 tabs 30-60 minutes prior to MRI.  Avoid taking narcotics in conjunction with this medication.  Avoid alcohol intake 24 hours prior to and after taking this medication. 2 tablet 0     diazepam (VALIUM) 5 MG tablet  "Take 1 Tab as directed by MRI staff.  May repeat once. 2 tablet 0     DULoxetine (CYMBALTA) 60 MG capsule Take 1 capsule (60 mg) by mouth daily 60 capsule 1     Ginger, Zingiber officinalis, (GINGER ROOT) 550 MG CAPS capsule Take 550 mg by mouth daily Reported on 5/4/2017       hydrOXYzine (ATARAX) 25 MG tablet Take 1-2 tablets (25-50 mg) by mouth every 8 hours as needed for itching or anxiety (Patient not taking: Reported on 6/10/2021) 60 tablet 0     naproxen sodium 220 MG capsule Take 220 mg by mouth 2 times daily (with meals)       propranolol (INDERAL) 10 MG tablet Take 1 tablet (10 mg) by mouth 3 times daily as needed (anxiety) 30 tablet 1       No Known Allergies    Family History   Problem Relation Age of Onset     Cardiovascular Maternal Grandmother         COPD     Blood Disease Maternal Grandfather         Blood clot       Additional medical/Social/Surgical histories reviewed in [facility] and updated as appropriate.       PHYSICAL EXAM  Ht 1.816 m (5' 11.5\")   Wt 120.7 kg (266 lb)   BMI 36.58 kg/m      Deferred    IMAGING : MRI CDI LEFT SHOULDER   MRI revealing SLAP tear of left shoulder        ASSESSMENT & PLAN  Mr. Weems is a 36 year old year old male who presents to clinic today with RECHECK (Left shoulder MRI)    MR confirming SLAP tear of labrum    Conservative treatment options reviewed and he has performed extensive therapy on left shoulder, mirroring labrum exercises from right labrum tear.  I did offer formal PT for left shoulder, considering corticosteroid injection. He does want to return to his shoulder surgeon at [facility] to discuss surgical intervention as he feels he has exhausted conservative measures.  Referral provided and he may return to his surgeon at [facility] [place]. Happy to set him up with one of our arthroscopic shoulder surgeons here as well if he desires.    It was a pleasure seeing Lauro Toscano, , [facility]  Primary Care Sports Medicine      "

## 2021-07-13 ENCOUNTER — VIRTUAL VISIT (OUTPATIENT)
Dept: ORTHOPEDICS | Facility: CLINIC | Age: 36
End: 2021-07-13
Payer: COMMERCIAL

## 2021-07-13 DIAGNOSIS — G89.29 CHRONIC BILATERAL LOW BACK PAIN WITHOUT SCIATICA: Primary | ICD-10-CM

## 2021-07-13 DIAGNOSIS — G89.29 CHRONIC NECK PAIN: ICD-10-CM

## 2021-07-13 DIAGNOSIS — M54.2 CHRONIC NECK PAIN: ICD-10-CM

## 2021-07-13 DIAGNOSIS — M51.379 DDD (DEGENERATIVE DISC DISEASE), LUMBOSACRAL: ICD-10-CM

## 2021-07-13 DIAGNOSIS — M54.50 CHRONIC BILATERAL LOW BACK PAIN WITHOUT SCIATICA: Primary | ICD-10-CM

## 2021-07-13 PROCEDURE — 99214 OFFICE O/P EST MOD 30 MIN: CPT | Mod: 95 | Performed by: ORTHOPAEDIC SURGERY

## 2021-07-13 RX ORDER — BUPRENORPHINE AND NALOXONE 8; 2 MG/1; MG/1
1 FILM, SOLUBLE BUCCAL; SUBLINGUAL
COMMUNITY
Start: 2021-07-13

## 2021-07-13 NOTE — LETTER
"    7/13/2021         RE: Khadar Weems  [address]  [facility] [address]        Dear Colleague,    Thank you for referring your patient, Khadar Weems, to the [facility]. Please see a copy of my visit note below.    VIRTUAL VISIT:  Patient is evaluated today via billable virtual visit.    The patient has been notified of following:   \"This virtual visit will be conducted via a call between you and your physician/provider. We have found that certain health care needs can be provided without the need for an in-person physical exam.  This service lets us provide the care you need with a virtual conversation.  If a prescription is necessary we can send it directly to your pharmacy.  If lab work is needed we can place an order for that and you can then stop by our lab to have the test done at a later time.  If during the course of the call the physician/provider feels a virtual visit is not appropriate, you will not be charged for this service.\"     Physician has received verbal consent for a Virtual Visit from the patient.  Platform used:  Telephone  Time:  3:29pm to 3:46pm  Originating Location (pt. Location): Home  Distant Location (provider location):  [facility]     Chief Complaint   Patient presents with     RECHECK     Follow up MRI results.        Last Visit Date: 6/10/21  Previous Impression:  34 yo M with chronic neck, thoracic, low back pain. No central nor foraminal stenosis on cervical MRI.  Left shoulder pain   Previous Plan:  - Referral to ortho sports clinic for left shoulder pain, suspect this is the main etiology of his pain.  - Referral to pain clinic for comprehensive pain management.   - Will order thoracic and lumbar MRI, call with results.  - Proceed with cervical KAREN as planned for tomorrow.      S>  36 year old male, here to review MRI results, and discuss further options.     6/11/21 - C7-T1 IL KAREN (c/o " Dr. Omar Piedra, [facility]).  No pain response noted in report.  Per patient, it helped, especially with his muscle spasms, and with his headache.  ~75% relief of neck sxs.  Took about a week to start working; still holding up until present.    Saw Dr. Toscano ([facility]  L shoulder MRI showed SLAP tear (done at [facility]).  Patient has appointment set up with Dr. Ge Barrett, who also did hs R shoulder surgery previously (2008).    LBP is primarily back pain, only occasional leg pain.  90% back, 10% legs.    Currently taking suboxone c/o Dr. Morales ([facility], addiction medicine).  Prior, patient was taking Kratum for a long time prior.      Oswestry (SHANNAN) Questionnaire    OSWESTRY DISABILITY INDEX 7/13/2021   Count 9   Sum 25   Oswestry Score (%) 55.56   Some recent data might be hidden          Neck Disability Index (NDI) Questionnaire    Neck Disability Index (NDI) 6/10/2021   Neck Disability Index: Count 10   NDI: Total Score = SUM (points for all 10 findings) 34   Neck Disability in Percent = (Total Score) / 50 * 100 68 (%)   Some recent data might be hidden          Visual Analog Pain Scale  Back Pain Scale 0-10: 3.5  Right leg pain: 0  Left leg pain: 0    PROMIS-10 Scores  Global Mental Health Score: (P) 8  Global Physical Health Score: (P) 10  PROMIS TOTAL - SUBSCORES: (P) 18    Physical Examination:    This was a virtual visit, so very limited exam could be performed.  Patient seemed alert, oriented x 3, cooperative, with coherent speech, and not in distress.  Able to respond to questions appropriately and follow instructions.    Imaging:    Thoracic and lumbar MRI from 7/7/2021 reviewed.  Thoracic MRI shows small disc protrusion T9-10; does not seem to be compressing either the spinal cord or nerve roots.  Lumbar MRI shows degenerative disc changes L4-5 and L5-S1.  There is central disc protrusion at L5-S1, which may potentially irritate bilateral traversing S1 nerve roots.  No evidence of  instability.    Assessment:    1.  Chronic lumbar discogenic pain secondary to degenerative disc disease L4-5 and L5-S1.  2.  Chronic axial neck pain secondary to cervical degenerative disc disease C5-C7.  3.  Left shoulder SLAP lesion.  4.  History of right shoulder surgery (2008).    Plan:    - Retrieve report from c-spine injection performed at [facility] on 6/11/21.  - Retrieve images and report from L shoulder MRI performed recently at [facility].  - Internal PT order  - Keep appt for L shoulder with Dr. Ge Doshi.  - Continue suboxone treatment c/o Dr. Duffy ([facility]).    Explained to him that it is best at this point to avoid surgery for either his lumbar or cervical spines.  While MRI scans show some degenerative changes, there is no evidence of instability, nor of significant spinal cord or nerve compression.  For discogenic pain alone, I explained to him that surgical treatment is not very predictable in terms of outcomes.  Moreover, this would entail spinal fusion, which also had 6 trade-offs, and these are big surgeries, and it is certainly possible that he may end up worse compared to what he started with.    Patient is in agreement.  He will try to continue with nonoperative treatment.  I also wished him the best regarding his shoulder evaluation and treatment.    TT 15 mins.  Return to clinic as needed.        Jorge Rain MD    Orthopaedic Spine Surgery  Dept Orthopaedic Surgery, [facility]  [phone] office, [phone] pager  [email or web address]

## 2021-07-13 NOTE — PROGRESS NOTES
"VIRTUAL VISIT:  Patient is evaluated today via billable virtual visit.    The patient has been notified of following:   \"This virtual visit will be conducted via a call between you and your physician/provider. We have found that certain health care needs can be provided without the need for an in-person physical exam.  This service lets us provide the care you need with a virtual conversation.  If a prescription is necessary we can send it directly to your pharmacy.  If lab work is needed we can place an order for that and you can then stop by our lab to have the test done at a later time.  If during the course of the call the physician/provider feels a virtual visit is not appropriate, you will not be charged for this service.\"     Physician has received verbal consent for a Virtual Visit from the patient.  Platform used:  Telephone  Time:  3:29pm to 3:46pm  Originating Location (pt. Location): [place]  Distant Location (provider location):  [facility] [place]     Chief Complaint   Patient presents with     RECHECK     Follow up MRI results.        Last Visit Date: 6/10/21  Previous Impression:  36 yo M with chronic neck, thoracic, low back pain. No central nor foraminal stenosis on cervical MRI.  Left shoulder pain   Previous Plan:  - Referral to ortho sports clinic for left shoulder pain, suspect this is the main etiology of his pain.  - Referral to pain clinic for comprehensive pain management.   - Will order thoracic and lumbar MRI, call with results.  - Proceed with cervical KAREN as planned for tomorrow.      S>  36 year old male, here to review MRI results, and discuss further options.     6/11/21 - C7-T1 IL KAREN (c/o Dr. Omar Piedra, [facility]).  No pain response noted in report.  Per patient, it helped, especially with his muscle spasms, and with his headache.  ~75% relief of neck sxs.  Took about a week to start working; still holding up until present.    S[facility] Dr. Toscano ([facility]  L " shoulder MRI showed SLAP tear (done at [facility]).  Patient has appointment set up with Dr. Ge Barrett, who also did hs R shoulder surgery previously (2008).    LBP is primarily back pain, only occasional leg pain.  90% back, 10% legs.    Currently taking suboxone c/o Dr. Morales ([facility], addiction medicine).  Prior, patient was taking Kratum for a long time prior.      Oswestry (SHANNAN) Questionnaire    OSWESTRY DISABILITY INDEX 7/13/2021   Count 9   Sum 25   Oswestry Score (%) 55.56   Some recent data might be hidden          Neck Disability Index (NDI) Questionnaire    Neck Disability Index (NDI) 6/10/2021   Neck Disability Index: Count 10   NDI: Total Score = SUM (points for all 10 findings) 34   Neck Disability in Percent = (Total Score) / 50 * 100 68 (%)   Some recent data might be hidden          Visual Analog Pain Scale  Back Pain Scale 0-10: 3.5  Right leg pain: 0  Left leg pain: 0    PROMIS-10 Scores  Global Mental Health Score: (P) 8  Global Physical Health Score: (P) 10  PROMIS TOTAL - SUBSCORES: (P) 18    Physical Examination:    This was a virtual visit, so very limited exam could be performed.  Patient seemed alert, oriented x 3, cooperative, with coherent speech, and not in distress.  Able to respond to questions appropriately and follow instructions.    Imaging:    Thoracic and lumbar MRI from 7/7/2021 reviewed.  Thoracic MRI shows small disc protrusion T9-10; does not seem to be compressing either the spinal cord or nerve roots.  Lumbar MRI shows degenerative disc changes L4-5 and L5-S1.  There is central disc protrusion at L5-S1, which may potentially irritate bilateral traversing S1 nerve roots.  No evidence of instability.    Assessment:    1.  Chronic lumbar discogenic pain secondary to degenerative disc disease L4-5 and L5-S1.  2.  Chronic axial neck pain secondary to cervical degenerative disc disease C5-C7.  3.  Left shoulder SLAP lesion.  4.  History of right shoulder surgery  (2008).    Plan:    - Retrieve report from c-spine injection performed at [facility] on 6/11/21.  - Retrieve images and report from L shoulder MRI performed recently at [facility].  - Internal PT order  - Keep appt for L shoulder with Dr. Ge Doshi.  - Continue suboxone treatment c/o Dr. Duffy ([facility]).    Explained to him that it is best at this point to avoid surgery for either his lumbar or cervical spines.  While MRI scans show some degenerative changes, there is no evidence of instability, nor of significant spinal cord or nerve compression.  For discogenic pain alone, I explained to him that surgical treatment is not very predictable in terms of outcomes.  Moreover, this would entail spinal fusion, which also had 6 trade-offs, and these are big surgeries, and it is certainly possible that he may end up worse compared to what he started with.    Patient is in agreement.  He will try to continue with nonoperative treatment.  I also wished him the best regarding his shoulder evaluation and treatment.    TT 15 mins.  Return to clinic as needed.        Jorge Rain MD    Orthopaedic Spine Surgery  Dept Orthopaedic Surgery, [facility]  [phone] office, [phone] pager  [email or web address]

## 2021-07-23 ENCOUNTER — OFFICE VISIT (OUTPATIENT)
Dept: ANESTHESIOLOGY | Facility: CLINIC | Age: 36
End: 2021-07-23
Attending: PHYSICIAN ASSISTANT
Payer: COMMERCIAL

## 2021-07-23 VITALS — DIASTOLIC BLOOD PRESSURE: 95 MMHG | HEART RATE: 92 BPM | OXYGEN SATURATION: 98 % | SYSTOLIC BLOOD PRESSURE: 141 MMHG

## 2021-07-23 DIAGNOSIS — S43.432A TEAR OF LEFT GLENOID LABRUM, INITIAL ENCOUNTER: ICD-10-CM

## 2021-07-23 DIAGNOSIS — M54.6 CHRONIC THORACIC BACK PAIN, UNSPECIFIED BACK PAIN LATERALITY: ICD-10-CM

## 2021-07-23 DIAGNOSIS — G89.29 CHRONIC BILATERAL LOW BACK PAIN WITHOUT SCIATICA: ICD-10-CM

## 2021-07-23 DIAGNOSIS — G89.29 CHRONIC NECK PAIN: ICD-10-CM

## 2021-07-23 DIAGNOSIS — M54.2 CHRONIC NECK PAIN: ICD-10-CM

## 2021-07-23 DIAGNOSIS — M54.50 CHRONIC BILATERAL LOW BACK PAIN WITHOUT SCIATICA: ICD-10-CM

## 2021-07-23 DIAGNOSIS — G89.29 CHRONIC THORACIC BACK PAIN, UNSPECIFIED BACK PAIN LATERALITY: ICD-10-CM

## 2021-07-23 DIAGNOSIS — M79.18 MYOFASCIAL MUSCLE PAIN: Primary | ICD-10-CM

## 2021-07-23 PROCEDURE — 99204 OFFICE O/P NEW MOD 45 MIN: CPT | Mod: GC | Performed by: ANESTHESIOLOGY

## 2021-07-23 ASSESSMENT — ENCOUNTER SYMPTOMS
NERVOUS/ANXIOUS: 1
DISTURBANCES IN COORDINATION: 0
NUMBNESS: 1
SINUS CONGESTION: 0
SPEECH CHANGE: 0
SMELL DISTURBANCE: 0
JOINT SWELLING: 1
MEMORY LOSS: 1
SEIZURES: 0
TROUBLE SWALLOWING: 0
RECTAL PAIN: 0
BOWEL INCONTINENCE: 0
LOSS OF CONSCIOUSNESS: 0
MUSCLE WEAKNESS: 1
SKIN CHANGES: 0
MUSCLE CRAMPS: 1
BLOOD IN STOOL: 0
SINUS PAIN: 0
MYALGIAS: 1
CONSTIPATION: 1
DYSURIA: 0
DIZZINESS: 0
SORE THROAT: 0
HEARTBURN: 1
JAUNDICE: 0
NECK PAIN: 1
FLANK PAIN: 0
TINGLING: 1
DIFFICULTY URINATING: 0
HEMATURIA: 0
TREMORS: 0
NECK MASS: 0
WEAKNESS: 1
NAUSEA: 0
HEADACHES: 1
INSOMNIA: 1
STIFFNESS: 1
ARTHRALGIAS: 1
BACK PAIN: 1
NAIL CHANGES: 0
VOMITING: 0
TASTE DISTURBANCE: 0
PARALYSIS: 0
ABDOMINAL PAIN: 0
BLOATING: 0
POOR WOUND HEALING: 0
DEPRESSION: 0
PANIC: 1
DIARRHEA: 0
DECREASED CONCENTRATION: 1
HOARSE VOICE: 0

## 2021-07-23 ASSESSMENT — PAIN SCALES - GENERAL: PAINLEVEL: MODERATE PAIN (5)

## 2021-07-23 ASSESSMENT — PATIENT HEALTH QUESTIONNAIRE - PHQ9
SUM OF ALL RESPONSES TO PHQ QUESTIONS 1-9: 14
10. IF YOU CHECKED OFF ANY PROBLEMS, HOW DIFFICULT HAVE THESE PROBLEMS MADE IT FOR YOU TO DO YOUR WORK, TAKE CARE OF THINGS AT HOME, OR GET ALONG WITH OTHER PEOPLE: EXTREMELY DIFFICULT
SUM OF ALL RESPONSES TO PHQ QUESTIONS 1-9: 14

## 2021-07-23 NOTE — PATIENT INSTRUCTIONS
Medications:    Tizanidine 4mg every night      *Please provide the clinic with a minium of 1 week notice, on all prescription refills.         Recommended Follow up:      Follow up 8-12 weeks       Please call [phone], option #1 to schedule your clinic appointment if you don't already have an appointment scheduled.        To speak with a nurse, schedule/reschedule/cancel a clinic appointment, or request a medication refill call: [phone], option #1.    You can also reach us by [employer]: [email or web address]

## 2021-07-23 NOTE — LETTER
7/23/2021       RE: Khadar Weems  [address]  [place] [place] [address]     Dear Colleague,    Thank you for referring your patient, Khadar Weems, to the [facility] FOR COMPREHENSIVE PAIN MANAGEMENT [place] at [facility]. Please see a copy of my visit note below.    ATTENDING ATTESTATION: I have seen and evaluated the patient with the fellow physician and agree with the history, examination, assessment, and plan as detailed below.  The patient is a 36 year old male with PMHx remarkable for anxiety and chronic neck and left shoulder pain who presents for evaluation of neck pain.  Based upon the patient's history, examination, and available imaging his pain is likely due primarily to widespread myofascial pain and left shoulder arthropathy for which he is scheduled for surgical repair in the coming weeks.  We will trial the addition of tizanidine 4 mg TID PRN at this time and re-assess the patient following left shoulder surgery to evaluate for any impact his may have had on neck pain or LUE symptoms.  In the setting of opioid use disorder in remission, continued analgesic therapy with Suboxone is recommended at this time per PCP.  Will follow up 6-8 weeks after surgery.    Dave Kenney MD    Department of Anesthesiology  Pain Management Division    VISIT RECOMMENDATIONS:  1. We will trial tizanidine 4 mg nightly, if no side effects could take up to 3 times daily  2. Patient has a left shoulder surgery coming up on August 8, following recovery from this procedure he will follow up at our clinic.  If he continues to have paresthesias down the left upper arm, we will consider an EMG/NCS at that time.  At this time it does not appear to involve the nerve roots  3. Would recommend continuing Suboxone and duloxetine as prescribed by his primary care physician.  Other buprenorphine formulations are not as potent as Suboxone.   Therefore, given his previous history of opioid abuse in the past and chronic pain syndrome he would benefit from continued Suboxone  4. He will follow up with us in 8 to 12 weeks, after shoulder surgery    COMPREHENSIVE PAIN CLINIC INITIAL EVALUATION    I had the pleasure of meeting Mr. Khadar Weems on 7/23/2021 in the [facility] Pain Clinic in consult for Dr. Arora with regards to his neck pain.    Subjective:  The patient is a 36 year old male with past medical history of anxiety and chronic neck and left shoulder pain who presents for evaluation of cervical neck pain.  The patient's pain began after a 25-30 foot drop off a tree, at 16 yo and has been experiencing pain since that time.  He reports that his pain is located primarily in left neck and radiates to the left shoulder.  The patient describes the pain as burning, aching, and shooting pain. He occasionally has some pain shooting down to the left elbow. However, this has subsided after ELROY.  He reports that the pain is made worse by repetitive movements when he uses his arms. His pain is improved with walking, however the benefit is starting to diminish.  In addition, he reports that suboxone and duloxetine have been quite helpful in relieving his pain. The patient's pain is most severe after sitting for prolonged periods of time, while at work.  He rates his average pain score at 4/10, but it can be as low as 3/10 or as severe as 7/10.     He denies any new problems with falls or balance, any new numbness or weakness of the arms or legs, any new bowel or bladder incontinence, any night sweats or unexplained fevers, or any sudden or unexpected weight loss.     Khadar Weems has not been seen at a pain clinic in the past.      Patient reported symptoms:  Patient Supplied Answers To the [facility] Pain Questionnaire  [facility] Pain -  Patient Entered Questionnaire/Answers 7/23/2021   What number best describes your pain right now:  0 = No pain  to  10 = Worst pain  imaginable 4   How would you describe the pain? burning, cramping, sharp, numbness, dull, aching, throbbing   Which of the following worsen your pain? lying down, standing, sitting, exercise   Which of the following improve or reduce your pain?  lying down, sitting, walking, medication, relaxation, thinking about something else   What number best describes your average pain for the past week:  0 = No pain  to  10 = Worst pain imaginable 4   What number best describes your LOWEST pain in past 24 hours:  0 = No pain  to  10 = Worst pain imaginable 3   What number best describes your WORST pain in past 24 hours:  0 = No pain  to  10 = Worst pain imaginable 7   When is your pain worst? PM   What non-medicine treatments have you already had for your pain? physical therapy, relaxation training, chiropractic care, TENS (electrical stimulator), surgery   Have you tried treating your pain with medication?  Yes   Are you currently taking medications for your pain? Yes             Current treatments:  1. Suboxone 8/2 mg  2. Duloxetine 60 mg daily  3. Naproxen OTC 1 tab daily    Previous medication treatments included:  Anti-convulsants: none  Muscle relaxors: Flexeril - some benefit;   Anti-depressants: Paxil   Acetaminophen/NSAIDs: yes  Topicals: icy/hot and tiger balm, no longer efficacious  Opioids: Oxycontin - abused in his 20's, Percocet for back pain, recently weaned off kratom    Other treatments have included:  Physical therapy: scheduled, however on hold for shoulder surgery  Pain Psychology: none  Chiropractic: yes, no benefit  Acupuncture: none  TENs Unit: yes, some benefit  Injections: C-KAREN - good relief  Surgeries: none    Past Medical History:  Medical history reviewed.   Past Medical History:   Diagnosis Date     Abdominal pain, unspecified site       Patient Active Problem List   Diagnosis     CARDIOVASCULAR SCREENING; LDL GOAL LESS THAN 160     Sensation of fullness in ear     Recurrent boils     Abdominal  pain, unspecified abdominal location     Leg length discrepancy     Elevated blood pressure reading without diagnosis of hypertension     CSF leak     Substance abuse (H)     PATEL (generalized anxiety disorder)     Chronic neck pain     DDD (degenerative disc disease), cervical     Chronic back pain, unspecified back location, unspecified back pain laterality       Past Surgical History:  Pertinent surgical history reviewed.   Past Surgical History:   Procedure Laterality Date     OPTICAL TRACKING SYSTEM ENDOSCOPIC RESECTION TUMOR CRANIAL N/A 4/14/2017    Procedure: OPTICAL TRACKING SYSTEM ENDOSCOPIC RESECTION TUMOR CRANIAL;  Surgeon: Emily Person MD;  Location: [facility] OR        Medications: Pertinent medications reviewed and updated  Current Outpatient Medications   Medication Sig Dispense Refill     buprenorphine HCl-naloxone HCl (SUBOXONE) 8-2 MG per film Place 1 Film under the tongue       cetirizine (ZYRTEC) 10 MG tablet Take 10 mg by mouth daily Reported on 5/4/2017       diazepam (VALIUM) 5 MG tablet Take 2 tablets (10 mg) by mouth once as needed for anxiety Pre-procedural anxiolytic for MRI.  Please take 2 tabs 30-60 minutes prior to MRI.  Avoid taking narcotics in conjunction with this medication.  Avoid alcohol intake 24 hours prior to and after taking this medication. 2 tablet 0     diazepam (VALIUM) 5 MG tablet Take 1 Tab as directed by MRI staff.  May repeat once. 2 tablet 0     DULoxetine (CYMBALTA) 60 MG capsule Take 1 capsule (60 mg) by mouth daily 60 capsule 1     Ginger, Zingiber officinalis, (GINGER ROOT) 550 MG CAPS capsule Take 550 mg by mouth daily Reported on 5/4/2017       hydrOXYzine (ATARAX) 25 MG tablet Take 1-2 tablets (25-50 mg) by mouth every 8 hours as needed for itching or anxiety (Patient not taking: Reported on 6/10/2021) 60 tablet 0     naproxen sodium 220 MG capsule Take 220 mg by mouth 2 times daily (with meals)       propranolol (INDERAL) 10 MG tablet Take 1 tablet (10 mg) by  mouth 3 times daily as needed (anxiety) 30 tablet 1       [place] and [place] Prescription Monitoring Program reviewed yes, no irregularities    Allergies: Pertinent allergies reviewed   No Known Allergies    Family History:   family history includes Blood Disease in his maternal grandfather; Cardiovascular in his maternal grandmother.    Social history:   Social History     Socioeconomic History     Marital status: Single     Spouse name: Not on file     Number of children: Not on file     Years of education: Not on file     Highest education level: Not on file   Occupational History     Occupation: [employer]     Employer: [employer]   Tobacco Use     Smoking status: Former Smoker     Smokeless tobacco: Current User     Types: Snuff, Chew   Substance and Sexual Activity     Alcohol use: Not Currently     Alcohol/week: 0.8 standard drinks     Types: 1 Standard drinks or equivalent per week     Comment: rarely     Drug use: No     Sexual activity: Yes   Other Topics Concern     Not on file   Social History Narrative    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       Social Determinants of Health     Financial Resource Strain:      Difficulty of Paying Living Expenses:    Food Insecurity:      Worried About Running Out of Food in the Last Year:      Ran Out of Food in the Last Year:    Transportation Needs:      Lack of Transportation (Medical):      Lack of Transportation (Non-Medical):    Physical Activity:      Days of Exercise per Week:      Minutes of Exercise per Session:    Stress:      Feeling of Stress :    Social Connections:      Frequency of Communication with Friends and Family:      Frequency of Social Gatherings with Friends and Family:      Attends [employer]ist Services:      Active Member of Clubs or Organizations:      Attends Club or Organization Meetings:      Marital Status:    Intimate Partner Violence:      Fear of Current or Ex-Partner:      Emotionally Abused:      Physically  Abused:      Sexually Abused:      Social History     Social History Narrative    Works in [employer] dept. Lives with his brother.       He lives in with his girlfriend. He is currently working as a /.  Denies smoking, Street drugs None current use.     Review of Systems:  Answers for HPI/ROS submitted by the patient on 7/23/2021  If you checked off any problems, how difficult have these problems made it for you to do your work, take care of things at home, or get along with other people?: Extremely difficult  PHQ9 TOTAL SCORE: 14  General Symptoms: No  Skin Symptoms: Yes  HENT Symptoms: Yes  EYE SYMPTOMS: No  HEART SYMPTOMS: No  LUNG SYMPTOMS: No  INTESTINAL SYMPTOMS: Yes  URINARY SYMPTOMS: Yes  REPRODUCTIVE SYMPTOMS: No  SKELETAL SYMPTOMS: Yes  BLOOD SYMPTOMS: No  NERVOUS SYSTEM SYMPTOMS: Yes  MENTAL HEALTH SYMPTOMS: Yes  Ear pain: No  Ear discharge: No  Hearing loss: No  Tinnitus: No  Nosebleeds: Yes  Congestion: No  Sinus pain: No  Trouble swallowing: No   Voice hoarseness: No  Mouth sores: No  Sore throat: No  Tooth pain: No  Gum tenderness: Yes  Bleeding gums: Yes  Change in taste: No  Change in sense of smell: No  Dry mouth: Yes  Hearing aid used: No  Neck lump: No  Changes in hair: No  Changes in moles/birth marks: No  Itching: Yes  Rashes: Yes  Changes in nails: No  Acne: Yes  Change in facial hair: No  Warts: No  Non-healing sores: No  Scarring: No  Flaking of skin: Yes  Color changes of hands/feet in cold : No  Sun sensitivity: No  Skin thickening: No  Heart burn or indigestion: Yes  Nausea: No  Vomiting: No  Abdominal pain: No  Bloating: No  Constipation: Yes  Diarrhea: No  Blood in stool: No  Black stools: No  Rectal or Anal pain: No  Fecal incontinence: No  Yellowing of skin or eyes: No  Vomit with blood: No  Change in stools: No  Trouble holding urine or incontinence: No  Pain or burning: No  Trouble starting or stopping: Yes  Increased frequency of urination:  No  Blood in urine: No  Decreased frequency of urination: No  Frequent nighttime urination: Yes  Flank pain: No  Difficulty emptying bladder: No  Back pain: Yes  Muscle aches: Yes  Neck pain: Yes  Swollen joints: Yes  Joint pain: Yes  Bone pain: No  Muscle cramps: Yes  Muscle weakness: Yes  Joint stiffness: Yes  Bone fracture: No  Trouble with coordination: No  Dizziness or trouble with balance: No  Fainting or black-out spells: No  Memory loss: Yes  Headache: Yes  Seizures: No  Speech problems: No  Tingling: Yes  Tremor: No  Weakness: Yes  Difficulty walking: No  Paralysis: No  Numbness: Yes  Nervous or Anxious: Yes  Depression: No  Trouble sleeping: Yes  Trouble thinking or concentrating: Yes  Mood changes: No  Panic attacks: Yes        Physical Exam:  BP (!) 141/95   Pulse 92   SpO2 98%     Physical Exam   Constitutional: He is oriented to person, place, and time.  He appears well-developed and well-nourished. He is not in acute distress.   HENT:     Head: Normocephalic and atraumatic.     Eyes: Pupils are equal, round, and reactive to light. EOM are normal. No scleral icterus.   Cardiovascular: Distal pulses palpable  Pulmonary/Chest:  NWOB. No respiratory distress.   Abdominal: deferred  Genitourinary: deferred  Neurological: He is alert and oriented to person, place, and time. CN II-XII grossly intact, coordination grossly normal.  Romberg test negative.  Skin: Skin is warm and dry. He is not diaphoretic.  No lesions or rashes noted  Psychiatric: He has a normal mood and affect. His behavior is normal. Judgment and thought content normal.    Pain exam:  neck: Normal range of motion. Neck supple.  Negative Spurling.  Mild to moderate tenderness palpation over bilateral traps and deltoids, also along the medial border of the scapula.  No trigger points were identified, however there was diffuse myofascial pain. Left shoulder limited ROM, secondary to pain from SLAP tear  MSK: Gait is normal   Strength:  Tricep/bicep//interosseous 5 out of 5 bilaterally  Light sensation intact  Negative Phalen's and negative Tinel's      Imaging:  Thoracic/Lumbar MRI 7/7/2021  Impression:   1. Thoracic spine: Small central disc protrusion at T9-10, indenting  the ventral thecal sac. No spinal canal or neuroforaminal stenosis.     2. Lumbar spine: Mild lumbar degenerative changes at L4-5 and L5-S1  with bilateral mild neural foraminal stenosis. No significant spinal  canal stenosis at any level.    XR CERVICAL SPINE  6/10/2021      FINDINGS: Standing AP and lateral views of the cervical spine.  Straightening of the expected cervical lordosis. Vertebral body  heights are maintained.. No fractures are visualized. Intervertebral  disc heights are maintained. Apical lungs are clear. Mastoid air cells  are aerated.                                                                   IMPRESSION: No acute fractures or malalignment.    Cervical MRI 5/14/2021       EMG:  none    Laboratory Results:  reviewed    Assessment:  The patient is a 36 year old male with PMHx of anxiety and chronic pain who was referred by Dr. Arora for evaluation of cervical neck pain.  Patient history, examination, and imaging are significant for diffuse myofascial pain and mild degenerative changes to the cervical thoracic and lumbar spine on MRI imaging, there is some mild disc bulge throughout, however there is no central canal stenosis or foraminal stenosis on MRI.  The patient has history of selective on the left shoulder, which could be adding to his left shoulder pain and paresthesias down to his elbow.  He is scheduled to get a SLAP surgery on 8/11/2021.  If paresthesias continue following shoulder surgery, we will consider obtaining an EMG/NCS at that time.  Patient also has a history of opioid abuse in his 20s, and recently weaned off over-the-counter kratom.  He was started on Suboxone and duloxetine by his PCP, and the patient reports excellent benefit  from these 2 medications.  At this time, we would recommend continuing this regimen as it is providing good benefit.  Patient will follow up with us 8 to 12 weeks following his shoulder surgery which.    Visit Diagnoses:  (M79.18) Myofascial muscle pain  (primary encounter diagnosis)  (M54.6,  G89.29) Chronic thoracic back pain, unspecified back pain laterality  (M54.2,  G89.29) Chronic neck pain  (M54.5,  G89.29) Chronic bilateral low back pain without sciatica  (S43.432A) Tear of left glenoid labrum, initial encounter    Plan:  Patient education:    We discussed with the patient the likely mechanisms underlying his pain.  In his case, this includes diffuse myofascial pain and left labral tear which are likely contributing to his overall pain picture.  In addition we discussed the role of central and peripheral pain processing in the development and propagation of chronic pain as a disorder as well as the importance of multidisciplinary treatment and multimodal medication therapy.    Work up:    we will consider an EMG/NCS of the left upper extremity, if paresthesias continue at this lab repair.    Referrals:    none    Medications:    We will trial tizanidine 4 mg at night, if no side effects can take up to 3 times daily    Therapies:    Will likely require following shoulder surgery    Interventions:    Would likely benefit from another cervical KAREN in the future.    Follow up: Follow-up in 8 to 12 weeks, following shoulder surgery.    Thank you for the consult.    Patient was seen and discussed with my staff Dr. Leilani Moss MD pain fellow      Again, thank you for allowing me to participate in the care of your patient.      Sincerely,    Dave Duke MD

## 2021-07-23 NOTE — NURSING NOTE
Chief Complaint   Patient presents with     Consult     Neck, back  and bilateral shoulder pain     Alona Danielson, IVA

## 2021-07-23 NOTE — PROGRESS NOTES
ATTENDING ATTESTATION: I have seen and evaluated the patient with the fellow physician and agree with the history, examination, assessment, and plan as detailed below.  The patient is a 36 year old male with PMHx remarkable for anxiety and chronic neck and left shoulder pain who presents for evaluation of neck pain.  Based upon the patient's history, examination, and available imaging his pain is likely due primarily to widespread myofascial pain and left shoulder arthropathy for which he is scheduled for surgical repair in the coming weeks.  We will trial the addition of tizanidine 4 mg TID PRN at this time and re-assess the patient following left shoulder surgery to evaluate for any impact his may have had on neck pain or LUE symptoms.  In the setting of opioid use disorder in remission, continued analgesic therapy with Suboxone is recommended at this time per PCP.  Will follow up 6-8 weeks after surgery.    Dave Kenney MD    Department of Anesthesiology  Pain Management Division    VISIT RECOMMENDATIONS:  1. We will trial tizanidine 4 mg nightly, if no side effects could take up to 3 times daily  2. Patient has a left shoulder surgery coming up on August 8, following recovery from this procedure he will follow up at our clinic.  If he continues to have paresthesias down the left upper arm, we will consider an EMG/NCS at that time.  At this time it does not appear to involve the nerve roots  3. Would recommend continuing Suboxone and duloxetine as prescribed by his primary care physician.  Other buprenorphine formulations are not as potent as Suboxone.  Therefore, given his previous history of opioid abuse in the past and chronic pain syndrome he would benefit from continued Suboxone  4. He will follow up with us in 8 to 12 weeks, after shoulder surgery    COMPREHENSIVE PAIN CLINIC INITIAL EVALUATION    I had the pleasure of meeting Mr. Khadar Weems on 7/23/2021 in the [facility] in consult for Dr. Arora with regards to his neck pain.    Subjective:  The patient is a 36 year old male with past medical history of anxiety and chronic neck and left shoulder pain who presents for evaluation of cervical neck pain.  The patient's pain began after a 25-30 foot drop off a tree, at 16 yo and has been experiencing pain since that time.  He reports that his pain is located primarily in left neck and radiates to the left shoulder.  The patient describes the pain as burning, aching, and shooting pain. He occasionally has some pain shooting down to the left elbow. However, this has subsided after ELROY.  He reports that the pain is made worse by repetitive movements when he uses his arms. His pain is improved with walking, however the benefit is starting to diminish.  In addition, he reports that suboxone and duloxetine have been quite helpful in relieving his pain. The patient's pain is most severe after sitting for prolonged periods of time, while at work.  He rates his average pain score at 4/10, but it can be as low as 3/10 or as severe as 7/10.     He denies any new problems with falls or balance, any new numbness or weakness of the arms or legs, any new bowel or bladder incontinence, any night sweats or unexplained fevers, or any sudden or unexpected weight loss.     Khadar Weems has not been seen at a pain clinic in the past.      Patient reported symptoms:  Patient Supplied Answers To the [facility] Pain Questionnaire  [facility] -  Patient Entered Questionnaire/Answers 7/23/2021   What number best describes your pain right now:  0 = No pain  to  10 = Worst pain imaginable 4   How would you describe the pain? burning, cramping, sharp, numbness, dull, aching, throbbing   Which of the following worsen your pain? lying down, standing, sitting, exercise   Which of the following improve or reduce your pain?  lying down, sitting, walking, medication, relaxation, thinking about something else   What  number best describes your average pain for the past week:  0 = No pain  to  10 = Worst pain imaginable 4   What number best describes your LOWEST pain in past 24 hours:  0 = No pain  to  10 = Worst pain imaginable 3   What number best describes your WORST pain in past 24 hours:  0 = No pain  to  10 = Worst pain imaginable 7   When is your pain worst? PM   What non-medicine treatments have you already had for your pain? physical therapy, relaxation training, chiropractic care, TENS (electrical stimulator), surgery   Have you tried treating your pain with medication?  Yes   Are you currently taking medications for your pain? Yes             Current treatments:  1. Suboxone 8/2 mg  2. Duloxetine 60 mg daily  3. Naproxen OTC 1 tab daily    Previous medication treatments included:  Anti-convulsants: none  Muscle relaxors: Flexeril - some benefit;   Anti-depressants: Paxil   Acetaminophen/NSAIDs: yes  Topicals: icy/hot and tiger balm, no longer efficacious  Opioids: Oxycontin - abused in his 20's, Percocet for back pain, recently weaned off kratom    Other treatments have included:  Physical therapy: scheduled, however on hold for shoulder surgery  Pain Psychology: none  Chiropractic: yes, no benefit  Acupuncture: none  TENs Unit: yes, some benefit  Injections: C-KAREN - good relief  Surgeries: none    Past Medical History:  Medical history reviewed.   Past Medical History:   Diagnosis Date     Abdominal pain, unspecified site       Patient Active Problem List   Diagnosis     CARDIOVASCULAR SCREENING; LDL GOAL LESS THAN 160     Sensation of fullness in ear     Recurrent boils     Abdominal pain, unspecified abdominal location     Leg length discrepancy     Elevated blood pressure reading without diagnosis of hypertension     CSF leak     Substance abuse (H)     PATEL (generalized anxiety disorder)     Chronic neck pain     DDD (degenerative disc disease), cervical     Chronic back pain, unspecified back location, unspecified  back pain laterality       Past Surgical History:  Pertinent surgical history reviewed.   Past Surgical History:   Procedure Laterality Date     OPTICAL TRACKING SYSTEM ENDOSCOPIC RESECTION TUMOR CRANIAL N/A 4/14/2017    Procedure: OPTICAL TRACKING SYSTEM ENDOSCOPIC RESECTION TUMOR CRANIAL;  Surgeon: Emily Person MD;  Location:  OR        Medications: Pertinent medications reviewed and updated  Current Outpatient Medications   Medication Sig Dispense Refill     buprenorphine HCl-naloxone HCl (SUBOXONE) 8-2 MG per film Place 1 Film under the tongue       cetirizine (ZYRTEC) 10 MG tablet Take 10 mg by mouth daily Reported on 5/4/2017       diazepam (VALIUM) 5 MG tablet Take 2 tablets (10 mg) by mouth once as needed for anxiety Pre-procedural anxiolytic for MRI.  Please take 2 tabs 30-60 minutes prior to MRI.  Avoid taking narcotics in conjunction with this medication.  Avoid alcohol intake 24 hours prior to and after taking this medication. 2 tablet 0     diazepam (VALIUM) 5 MG tablet Take 1 Tab as directed by MRI staff.  May repeat once. 2 tablet 0     DULoxetine (CYMBALTA) 60 MG capsule Take 1 capsule (60 mg) by mouth daily 60 capsule 1     Ginger, Zingiber officinalis, (GINGER ROOT) 550 MG CAPS capsule Take 550 mg by mouth daily Reported on 5/4/2017       hydrOXYzine (ATARAX) 25 MG tablet Take 1-2 tablets (25-50 mg) by mouth every 8 hours as needed for itching or anxiety (Patient not taking: Reported on 6/10/2021) 60 tablet 0     naproxen sodium 220 MG capsule Take 220 mg by mouth 2 times daily (with meals)       propranolol (INDERAL) 10 MG tablet Take 1 tablet (10 mg) by mouth 3 times daily as needed (anxiety) 30 tablet 1       [place] and [place] Prescription Monitoring Program reviewed yes, no irregularities    Allergies: Pertinent allergies reviewed   No Known Allergies    Family History:   family history includes Blood Disease in his maternal grandfather; Cardiovascular in his maternal grandmother.    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       Social Determinants of Health     Financial Resource Strain:      Difficulty of Paying Living Expenses:    Food Insecurity:      Worried About Running Out of Food in the Last Year:      Ran Out of Food in the Last Year:    Transportation Needs:      Lack of Transportation (Medical):      Lack of Transportation (Non-Medical):    Physical Activity:      Days of Exercise per Week:      Minutes of Exercise per Session:    Stress:      Feeling of Stress :    Social Connections:      Frequency of Communication with Friends and Family:      Frequency of Social Gatherings with Friends and Family:      Attends [employer] Services:      Active Member of Clubs or Organizations:      Attends Club or Organization Meetings:      Marital Status:    Intimate Partner Violence:      Fear of Current or Ex-Partner:      Emotionally Abused:      Physically Abused:      Sexually Abused:      Social History     Social History Narrative    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       He lives in with his girlfriend. He is currently working as a /.  Denies smoking, Street drugs None current use.     Review of Systems:  Answers for HPI/ROS  submitted by the patient on 7/23/2021  If you checked off any problems, how difficult have these problems made it for you to do your work, take care of things at home, or get along with other people?: Extremely difficult  PHQ9 TOTAL SCORE: 14  General Symptoms: No  Skin Symptoms: Yes  HENT Symptoms: Yes  EYE SYMPTOMS: No  HEART SYMPTOMS: No  LUNG SYMPTOMS: No  INTESTINAL SYMPTOMS: Yes  URINARY SYMPTOMS: Yes  REPRODUCTIVE SYMPTOMS: No  SKELETAL SYMPTOMS: Yes  BLOOD SYMPTOMS: No  NERVOUS SYSTEM SYMPTOMS: Yes  MENTAL HEALTH SYMPTOMS: Yes  Ear pain: No  Ear discharge: No  Hearing loss: No  Tinnitus: No  Nosebleeds: Yes  Congestion: No  Sinus pain: No  Trouble swallowing: No   Voice hoarseness: No  Mouth sores: No  Sore throat: No  Tooth pain: No  Gum tenderness: Yes  Bleeding gums: Yes  Change in taste: No  Change in sense of smell: No  Dry mouth: Yes  Hearing aid used: No  Neck lump: No  Changes in hair: No  Changes in moles/birth marks: No  Itching: Yes  Rashes: Yes  Changes in nails: No  Acne: Yes  Change in facial hair: No  Warts: No  Non-healing sores: No  Scarring: No  Flaking of skin: Yes  Color changes of hands/feet in cold : No  Sun sensitivity: No  Skin thickening: No  Heart burn or indigestion: Yes  Nausea: No  Vomiting: No  Abdominal pain: No  Bloating: No  Constipation: Yes  Diarrhea: No  Blood in stool: No  Black stools: No  Rectal or Anal pain: No  Fecal incontinence: No  Yellowing of skin or eyes: No  Vomit with blood: No  Change in stools: No  Trouble holding urine or incontinence: No  Pain or burning: No  Trouble starting or stopping: Yes  Increased frequency of urination: No  Blood in urine: No  Decreased frequency of urination: No  Frequent nighttime urination: Yes  Flank pain: No  Difficulty emptying bladder: No  Back pain: Yes  Muscle aches: Yes  Neck pain: Yes  Swollen joints: Yes  Joint pain: Yes  Bone pain: No  Muscle cramps: Yes  Muscle weakness: Yes  Joint stiffness: Yes  Bone fracture:  No  Trouble with coordination: No  Dizziness or trouble with balance: No  Fainting or black-out spells: No  Memory loss: Yes  Headache: Yes  Seizures: No  Speech problems: No  Tingling: Yes  Tremor: No  Weakness: Yes  Difficulty walking: No  Paralysis: No  Numbness: Yes  Nervous or Anxious: Yes  Depression: No  Trouble sleeping: Yes  Trouble thinking or concentrating: Yes  Mood changes: No  Panic attacks: Yes        Physical Exam:  BP (!) 141/95   Pulse 92   SpO2 98%     Physical Exam   Constitutional: He is oriented to person, place, and time.  He appears well-developed and well-nourished. He is not in acute distress.   HENT:     Head: Normocephalic and atraumatic.     Eyes: Pupils are equal, round, and reactive to light. EOM are normal. No scleral icterus.   Cardiovascular: Distal pulses palpable  Pulmonary/Chest:  NWOB. No respiratory distress.   Abdominal: deferred  Genitourinary: deferred  Neurological: He is alert and oriented to person, place, and time. CN II-XII grossly intact, coordination grossly normal.  Romberg test negative.  Skin: Skin is warm and dry. He is not diaphoretic.  No lesions or rashes noted  Psychiatric: He has a normal mood and affect. His behavior is normal. Judgment and thought content normal.    Pain exam:  neck: Normal range of motion. Neck supple.  Negative Spurling.  Mild to moderate tenderness palpation over bilateral traps and deltoids, also along the medial border of the scapula.  No trigger points were identified, however there was diffuse myofascial pain. Left shoulder limited ROM, secondary to pain from SLAP tear  MSK: Gait is normal   Strength: Tricep/bicep//interosseous 5 out of 5 bilaterally  Light sensation intact  Negative Phalen's and negative Tinel's      Imaging:  Thoracic/Lumbar MRI 7/7/2021  Impression:   1. Thoracic spine: Small central disc protrusion at T9-10, indenting  the ventral thecal sac. No spinal canal or neuroforaminal stenosis.     2. Lumbar spine:  Mild lumbar degenerative changes at L4-5 and L5-S1  with bilateral mild neural foraminal stenosis. No significant spinal  canal stenosis at any level.    XR CERVICAL SPINE  6/10/2021      FINDINGS: Standing AP and lateral views of the cervical spine.  Straightening of the expected cervical lordosis. Vertebral body  heights are maintained.. No fractures are visualized. Intervertebral  disc heights are maintained. Apical lungs are clear. Mastoid air cells  are aerated.                                                                   IMPRESSION: No acute fractures or malalignment.    Cervical MRI 5/14/2021       EMG:  none    Laboratory Results:  reviewed    Assessment:  The patient is a 36 year old male with PMHx of anxiety and chronic pain who was referred by Dr. Arora for evaluation of cervical neck pain.  Patient history, examination, and imaging are significant for diffuse myofascial pain and mild degenerative changes to the cervical thoracic and lumbar spine on MRI imaging, there is some mild disc bulge throughout, however there is no central canal stenosis or foraminal stenosis on MRI.  The patient has history of selective on the left shoulder, which could be adding to his left shoulder pain and paresthesias down to his elbow.  He is scheduled to get a SLAP surgery on 8/11/2021.  If paresthesias continue following shoulder surgery, we will consider obtaining an EMG/NCS at that time.  Patient also has a history of opioid abuse in his 20s, and recently weaned off over-the-counter kratom.  He was started on Suboxone and duloxetine by his PCP, and the patient reports excellent benefit from these 2 medications.  At this time, we would recommend continuing this regimen as it is providing good benefit.  Patient will follow up with us 8 to 12 weeks following his shoulder surgery which.    Visit Diagnoses:  (M79.18) Myofascial muscle pain  (primary encounter diagnosis)  (M54.6,  G89.29) Chronic thoracic back pain,  unspecified back pain laterality  (M54.2,  G89.29) Chronic neck pain  (M54.5,  G89.29) Chronic bilateral low back pain without sciatica  (S43.432A) Tear of left glenoid labrum, initial encounter    Plan:  Patient education:    We discussed with the patient the likely mechanisms underlying his pain.  In his case, this includes diffuse myofascial pain and left labral tear which are likely contributing to his overall pain picture.  In addition we discussed the role of central and peripheral pain processing in the development and propagation of chronic pain as a disorder as well as the importance of multidisciplinary treatment and multimodal medication therapy.    Work up:    we will consider an EMG/NCS of the left upper extremity, if paresthesias continue at this lab repair.    Referrals:    none    Medications:    We will trial tizanidine 4 mg at night, if no side effects can take up to 3 times daily    Therapies:    Will likely require following shoulder surgery    Interventions:    Would likely benefit from another cervical KAREN in the future.    Follow up: Follow-up in 8 to 12 weeks, following shoulder surgery.    Thank you for the consult.    Patient was seen and discussed with my staff Dr. Leilani Moss MD pain fellow

## 2021-07-24 ENCOUNTER — HEALTH MAINTENANCE LETTER (OUTPATIENT)
Age: 36
End: 2021-07-24

## 2021-07-24 ASSESSMENT — PATIENT HEALTH QUESTIONNAIRE - PHQ9: SUM OF ALL RESPONSES TO PHQ QUESTIONS 1-9: 14

## 2021-07-27 NOTE — PROGRESS NOTES
[facility] [address] [place] [address]  Phone: [phone]  Primary Provider: Debbie Mckeon  Pre-op Performing Provider: SUSAN MCKEON      PREOPERATIVE EVALUATION:  Today's date: 7/28/2021    Khadar Weems is a 36 year old male who presents for a preoperative evaluation.    Surgical Information:  Surgery/Procedure: left shoulder SLAP repair  Surgery Location: [facility]   Surgeon: Dr. Ge Barrett   Surgery Date: 8-11-21  Time of Surgery: TBD   Where patient plans to recover: At home with family  Fax number for surgical facility: [phone]    Type of Anesthesia Anticipated: to be determined    Assessment & Plan     The proposed surgical procedure is considered INTERMEDIATE risk.    Preop general physical exam  Superior glenoid labrum lesion of left shoulder, sequela  Preop exam completed today for SLAP repair left shoulder.   - CBC with Platelets and Reflex to Iron Studies  - INR  - Basic metabolic panel  (Ca, Cl, CO2, Creat, Gluc, K, Na, BUN)  - Basic metabolic panel  (Ca, Cl, CO2, Creat, Gluc, K, Na, BUN)  - INR  - CBC with Platelets and Reflex to Iron Studies    PATEL (generalized anxiety disorder)  Mood is controlled with Cymbalta, suboxone, propranolol as needed. He contacted his physician prescribing suboxone and was advised to discontinue 36 hours prior to surgery to allow narcotics to be effective and not restart until off of narcotics. We discussed OK to use propranolol as needed for anxiety during this time and continue Cymbalta without change.     Chronic neck pain  Continue Flexeril as needed. OK to use Tylenol. He has discontinued naproxen already.     DDD (degenerative disc disease), cervical    History of substance use disorder  On Suboxone. No longer using any substances as of this spring.      Risks and Recommendations:  The patient has the following additional risks and recommendations:   - Consult Hospitalist / IM  to assist with post-op medical management IF complications arise   - Patient was advised to consult with his grandfather to see if there has been any genetic testing for clotting disorders, however feel it is reasonable to proceed with surgery given grandfather's advanced age, co morbidities, no other family history and patient has no personal history of clotting issues through two previous surgeries    Medication Instructions:  Patient is to take all scheduled medications on the day of surgery EXCEPT for modifications listed below:  -Discontinue suboxone 36 hours pre op, discontinue naproxen    RECOMMENDATION:  APPROVAL GIVEN to proceed with proposed procedure, without further diagnostic evaluation.      Subjective     HPI related to upcoming procedure: Tear of labrum 5 years ago with worsening symptoms over time. Now having pain, numbness, tingling in shoulder and arm. Had the same surgery on his right shoulder previously.     Preop Questions 7/23/2021   1. Have you ever had a heart attack or stroke? No   2. Have you ever had surgery on your heart or blood vessels, such as a stent placement, a coronary artery bypass, or surgery on an artery in your head, neck, heart, or legs? No   3. Do you have chest pain with activity? No, patient can do heavy household work and climb at least 2 flights of stairs, MET score >4   4. Do you have a history of  heart failure? No   5. Do you currently have a cold, bronchitis or symptoms of other infection? No   6. Do you have a cough, shortness of breath, or wheezing? No   7. Do you or anyone in your family have previous history of blood clots? YES - grandfather had what sounds to be pulmonary embolism, also hx of stroke, CHF. Patient is unsure if genetic clotting syndrome. No other family members with clotting issues. Patient has had two surgeries previously and no clotting issues.    8. Do you or does anyone in your family have a serious bleeding problem such as prolonged bleeding  following surgeries or cuts? No   9. Have you ever had problems with anemia or been told to take iron pills? No   10. Have you had any abnormal blood loss such as black, tarry or bloody stools? No   11. Have you ever had a blood transfusion? No   12. Are you willing to have a blood transfusion if it is medically needed before, during, or after your surgery? Yes   13. Have you or any of your relatives ever had problems with anesthesia? No   14. Do you have sleep apnea, excessive snoring or daytime drowsiness? No   15. Do you have any artifical heart valves or other implanted medical devices like a pacemaker, defibrillator, or continuous glucose monitor? No   16. Do you have artificial joints? No   17. Are you allergic to latex? No     Health Care Directive:  Patient does not have a Health Care Directive or Living Will: Discussed advance care planning with patient; however, patient declined at this time.    Preoperative Review of :   reviewed - controlled substances reflected in medication list.      Status of Chronic Conditions:  See problem list for active medical problems.  Problems all longstanding and stable, except as noted/documented.  See ROS for pertinent symptoms related to these conditions.     H/o substance abuse disorder, chronic neck and back pain - He is taking suboxone 8-2 mg film twice daily. History of kratom use (no longer using). No alcohol, tobacco use. Chronic neck pain managed with ELROY, tizanidine as needed, naproxen once daily.    PATEL - Controlled with suboxone and Cymbalta. Uses propranolol sparingly.     Low testosterone - Prescribed 200 mg weekly from provider in [place], Dr. Newman via virtual visits. Found to have low levels in May.       Review of Systems  Constitutional, neuro, ENT, endocrine, pulmonary, cardiac, gastrointestinal, genitourinary, musculoskeletal, integument and psychiatric systems are negative, except as otherwise noted.    Patient Active Problem List     Diagnosis Date Noted     Substance abuse (H) 06/16/2021     Priority: Medium     PATEL (generalized anxiety disorder) 06/16/2021     Priority: Medium     Chronic neck pain 06/16/2021     Priority: Medium     DDD (degenerative disc disease), cervical 06/16/2021     Priority: Medium     Chronic back pain, unspecified back location, unspecified back pain laterality 06/16/2021     Priority: Medium     Elevated blood pressure reading without diagnosis of hypertension 04/11/2017     Priority: Medium     CSF leak 04/11/2017     Priority: Medium     Leg length discrepancy 06/23/2012     Priority: Medium     Sensation of fullness in ear 06/19/2012     Priority: Medium     Recurrent boils 06/19/2012     Priority: Medium     Abdominal pain, unspecified abdominal location 06/19/2012     Priority: Medium     Problem list name updated by automated process. Provider to review       CARDIOVASCULAR SCREENING; LDL GOAL LESS THAN 160 10/31/2010     Priority: Medium      Past Medical History:   Diagnosis Date     Abdominal pain, unspecified site      Past Surgical History:   Procedure Laterality Date     OPTICAL TRACKING SYSTEM ENDOSCOPIC RESECTION TUMOR CRANIAL N/A 04/14/2017    Procedure: OPTICAL TRACKING SYSTEM ENDOSCOPIC RESECTION TUMOR CRANIAL;  Surgeon: Emily Person MD;  Location: [facility] OR     SHOULDER SURGERY Right     Age 23     Current Outpatient Medications   Medication Sig Dispense Refill     buprenorphine HCl-naloxone HCl (SUBOXONE) 8-2 MG per film Place 1 Film under the tongue       DULoxetine (CYMBALTA) 60 MG capsule Take 1 capsule (60 mg) by mouth daily 60 capsule 1     naproxen sodium 220 MG capsule Take 220 mg by mouth 2 times daily (with meals)       propranolol (INDERAL) 10 MG tablet Take 1 tablet (10 mg) by mouth 3 times daily as needed (anxiety) 30 tablet 1     testosterone cypionate (DEPOTESTOSTERONE) 200 MG/ML injection Inject 1 mL (200 mg) into the muscle once a week       tiZANidine (ZANAFLEX) 4 MG tablet Take 1  tablet (4 mg) by mouth 3 times daily as needed for muscle spasms (Start 1 tablet at night. If no side effects may take up to three times daily as needed) 30 tablet 3     No Known Allergies     Social History     Tobacco Use     Smoking status: Former Smoker     Smokeless tobacco: Current User     Types: Snuff, Chew   Substance Use Topics     Alcohol use: Not Currently     Alcohol/week: 0.8 standard drinks     Types: 1 Standard drinks or equivalent per week     Comment: rarely     History   Drug Use No         Objective     /80   Pulse 98   Temp 98  F (36.7  C)   Wt 112.5 kg (248 lb)   SpO2 97%   BMI 34.11 kg/m      Physical Exam    GENERAL APPEARANCE: healthy, alert and no distress     EYES: EOMI,  PERRL     HENT: ear canals and TM's normal and nose and mouth without ulcers or lesions     NECK: no adenopathy, no asymmetry, masses, or scars and thyroid normal to palpation     RESP: lungs clear to auscultation - no rales, rhonchi or wheezes     CV: regular rates and rhythm, normal S1 S2, no S3 or S4 and no murmur, click or rub     ABDOMEN:  soft, nontender, no HSM or masses and bowel sounds normal     MS: extremities normal- no gross deformities noted, no evidence of inflammation in joints     SKIN: no suspicious lesions or rashes     NEURO: Normal strength and tone, sensory exam grossly normal, mentation intact and speech normal     PSYCH: mentation appears normal. and affect normal/bright     LYMPHATICS: No cervical adenopathy    No results for input(s): HGB, PLT, INR, NA, POTASSIUM, CR, A1C in the last 82368 hours.     Diagnostics:  Results for orders placed or performed in visit on 07/28/21   Basic metabolic panel  (Ca, Cl, CO2, Creat, Gluc, K, Na, BUN)     Status: Abnormal   Result Value Ref Range    Sodium 136 133 - 144 mmol/L    Potassium 4.0 3.4 - 5.3 mmol/L    Chloride 104 94 - 109 mmol/L    Carbon Dioxide (CO2) 27 20 - 32 mmol/L    Anion Gap 5 3 - 14 mmol/L    Urea Nitrogen 8 7 - 30 mg/dL     Creatinine 0.94 0.66 - 1.25 mg/dL    Calcium 10.2 (H) 8.5 - 10.1 mg/dL    Glucose 95 70 - 99 mg/dL    GFR Estimate >90 >60 mL/min/1.73m2   INR     Status: Normal   Result Value Ref Range    INR 1.11 0.85 - 1.15   CBC with Platelets and Reflex to Iron Studies     Status: Normal   Result Value Ref Range    WBC Count 8.2 4.0 - 11.0 10e3/uL    RBC Count 5.73 4.40 - 5.90 10e6/uL    Hemoglobin 16.1 13.3 - 17.7 g/dL    Hematocrit 49.1 40.0 - 53.0 %    MCV 86 78 - 100 fL    MCH 28.1 26.5 - 33.0 pg    MCHC 32.8 31.5 - 36.5 g/dL    RDW 12.6 10.0 - 15.0 %    Platelet Count 399 150 - 450 10e3/uL   Extra Green Top (Lithium Heparin) Tube     Status: None   Result Value Ref Range    Hold Specimen JIC    CBC with Platelets and Reflex to Iron Studies     Status: None    Narrative    The following orders were created for panel order CBC with Platelets and Reflex to Iron Studies.  Procedure                               Abnormality         Status                     ---------                               -----------         ------                     CBC with Platelets and R...[[id number]]  Normal              Final result               Extra Green Top (Lithium...[[id number]]                      Final result                 Please view results for these tests on the individual orders.       No EKG required, no history of coronary heart disease, significant arrhythmia, peripheral arterial disease or other structural heart disease.    Revised Cardiac Risk Index (RCRI):  The patient has the following serious cardiovascular risks for perioperative complications:   - No serious cardiac risks = 0 points     RCRI Interpretation: 0 points: Class I (very low risk - 0.4% complication rate)           Signed Electronically by: Vashti Rodriguez MD  Copy of this evaluation report is provided to requesting physician.

## 2021-07-28 ENCOUNTER — OFFICE VISIT (OUTPATIENT)
Dept: FAMILY MEDICINE | Facility: CLINIC | Age: 36
End: 2021-07-28
Payer: COMMERCIAL

## 2021-07-28 VITALS
DIASTOLIC BLOOD PRESSURE: 80 MMHG | BODY MASS INDEX: 34.11 KG/M2 | HEART RATE: 98 BPM | SYSTOLIC BLOOD PRESSURE: 116 MMHG | OXYGEN SATURATION: 97 % | WEIGHT: 248 LBS | TEMPERATURE: 98 F

## 2021-07-28 DIAGNOSIS — G89.29 CHRONIC NECK PAIN: ICD-10-CM

## 2021-07-28 DIAGNOSIS — S43.432S SUPERIOR GLENOID LABRUM LESION OF LEFT SHOULDER, SEQUELA: ICD-10-CM

## 2021-07-28 DIAGNOSIS — M54.2 CHRONIC NECK PAIN: ICD-10-CM

## 2021-07-28 DIAGNOSIS — M50.30 DDD (DEGENERATIVE DISC DISEASE), CERVICAL: ICD-10-CM

## 2021-07-28 DIAGNOSIS — Z87.898 HISTORY OF SUBSTANCE USE DISORDER: ICD-10-CM

## 2021-07-28 DIAGNOSIS — Z01.818 PREOP GENERAL PHYSICAL EXAM: Primary | ICD-10-CM

## 2021-07-28 DIAGNOSIS — F41.1 GAD (GENERALIZED ANXIETY DISORDER): ICD-10-CM

## 2021-07-28 LAB
ERYTHROCYTE [DISTWIDTH] IN BLOOD BY AUTOMATED COUNT: 12.6 % (ref 10–15)
HCT VFR BLD AUTO: 49.1 % (ref 40–53)
HGB BLD-MCNC: 16.1 G/DL (ref 13.3–17.7)
HOLD SPECIMEN: NORMAL
INR PPP: 1.11 (ref 0.85–1.15)
MCH RBC QN AUTO: 28.1 PG (ref 26.5–33)
MCHC RBC AUTO-ENTMCNC: 32.8 G/DL (ref 31.5–36.5)
MCV RBC AUTO: 86 FL (ref 78–100)
PLATELET # BLD AUTO: 399 10E3/UL (ref 150–450)
RBC # BLD AUTO: 5.73 10E6/UL (ref 4.4–5.9)
WBC # BLD AUTO: 8.2 10E3/UL (ref 4–11)

## 2021-07-28 PROCEDURE — 80048 BASIC METABOLIC PNL TOTAL CA: CPT | Performed by: STUDENT IN AN ORGANIZED HEALTH CARE EDUCATION/TRAINING PROGRAM

## 2021-07-28 PROCEDURE — 85027 COMPLETE CBC AUTOMATED: CPT | Performed by: STUDENT IN AN ORGANIZED HEALTH CARE EDUCATION/TRAINING PROGRAM

## 2021-07-28 PROCEDURE — 99214 OFFICE O/P EST MOD 30 MIN: CPT | Performed by: STUDENT IN AN ORGANIZED HEALTH CARE EDUCATION/TRAINING PROGRAM

## 2021-07-28 PROCEDURE — 85610 PROTHROMBIN TIME: CPT | Performed by: STUDENT IN AN ORGANIZED HEALTH CARE EDUCATION/TRAINING PROGRAM

## 2021-07-28 PROCEDURE — 36415 COLL VENOUS BLD VENIPUNCTURE: CPT | Performed by: STUDENT IN AN ORGANIZED HEALTH CARE EDUCATION/TRAINING PROGRAM

## 2021-07-28 RX ORDER — TESTOSTERONE CYPIONATE 200 MG/ML
200 INJECTION, SOLUTION INTRAMUSCULAR WEEKLY
COMMUNITY
Start: 2021-07-28 | End: 2022-03-29

## 2021-07-28 NOTE — PATIENT INSTRUCTIONS

## 2021-07-29 LAB
ANION GAP SERPL CALCULATED.3IONS-SCNC: 5 MMOL/L (ref 3–14)
BUN SERPL-MCNC: 8 MG/DL (ref 7–30)
CALCIUM SERPL-MCNC: 10.2 MG/DL (ref 8.5–10.1)
CHLORIDE BLD-SCNC: 104 MMOL/L (ref 94–109)
CO2 SERPL-SCNC: 27 MMOL/L (ref 20–32)
CREAT SERPL-MCNC: 0.94 MG/DL (ref 0.66–1.25)
GFR SERPL CREATININE-BSD FRML MDRD: >90 ML/MIN/1.73M2
GLUCOSE BLD-MCNC: 95 MG/DL (ref 70–99)
POTASSIUM BLD-SCNC: 4 MMOL/L (ref 3.4–5.3)
SODIUM SERPL-SCNC: 136 MMOL/L (ref 133–144)

## 2021-09-18 ENCOUNTER — HEALTH MAINTENANCE LETTER (OUTPATIENT)
Age: 36
End: 2021-09-18

## 2022-03-29 ENCOUNTER — OFFICE VISIT (OUTPATIENT)
Dept: FAMILY MEDICINE | Facility: CLINIC | Age: 37
End: 2022-03-29
Payer: COMMERCIAL

## 2022-03-29 VITALS
OXYGEN SATURATION: 96 % | BODY MASS INDEX: 29.57 KG/M2 | SYSTOLIC BLOOD PRESSURE: 124 MMHG | HEART RATE: 94 BPM | WEIGHT: 215 LBS | DIASTOLIC BLOOD PRESSURE: 88 MMHG | RESPIRATION RATE: 16 BRPM | TEMPERATURE: 98.4 F

## 2022-03-29 DIAGNOSIS — R03.0 ELEVATED BLOOD PRESSURE READING WITHOUT DIAGNOSIS OF HYPERTENSION: ICD-10-CM

## 2022-03-29 DIAGNOSIS — L25.8 CONTACT DERMATITIS DUE TO OTHER AGENT, UNSPECIFIED CONTACT DERMATITIS TYPE: Primary | ICD-10-CM

## 2022-03-29 DIAGNOSIS — L01.00 IMPETIGO: ICD-10-CM

## 2022-03-29 DIAGNOSIS — L29.9 ITCHING: ICD-10-CM

## 2022-03-29 DIAGNOSIS — R59.9 REACTIVE LYMPHADENOPATHY: ICD-10-CM

## 2022-03-29 DIAGNOSIS — F19.11 SUBSTANCE ABUSE IN REMISSION (H): ICD-10-CM

## 2022-03-29 DIAGNOSIS — R21 RASH AND NONSPECIFIC SKIN ERUPTION: ICD-10-CM

## 2022-03-29 DIAGNOSIS — F41.1 GAD (GENERALIZED ANXIETY DISORDER): ICD-10-CM

## 2022-03-29 PROBLEM — G96.00 CSF LEAK: Status: RESOLVED | Noted: 2017-04-11 | Resolved: 2022-03-29

## 2022-03-29 PROCEDURE — 99215 OFFICE O/P EST HI 40 MIN: CPT | Performed by: FAMILY MEDICINE

## 2022-03-29 RX ORDER — CEPHALEXIN 500 MG/1
500 CAPSULE ORAL 2 TIMES DAILY
Qty: 14 CAPSULE | Refills: 0 | Status: SHIPPED | OUTPATIENT
Start: 2022-03-29 | End: 2022-04-05

## 2022-03-29 RX ORDER — CETIRIZINE HYDROCHLORIDE 10 MG/1
10 TABLET ORAL DAILY
Qty: 7 TABLET | Refills: 0 | Status: SHIPPED | OUTPATIENT
Start: 2022-03-29 | End: 2022-04-05

## 2022-03-29 RX ORDER — FINASTERIDE 1 MG/1
1 TABLET, FILM COATED ORAL DAILY
COMMUNITY

## 2022-03-29 RX ORDER — SPIRONOLACTONE 25 MG/1
25 TABLET ORAL DAILY
COMMUNITY

## 2022-03-29 RX ORDER — MUPIROCIN 20 MG/G
OINTMENT TOPICAL 3 TIMES DAILY
Qty: 30 G | Refills: 0 | Status: SHIPPED | OUTPATIENT
Start: 2022-03-29 | End: 2022-04-08

## 2022-03-29 RX ORDER — HYDROXYZINE HYDROCHLORIDE 10 MG/1
10 TABLET, FILM COATED ORAL DAILY PRN
COMMUNITY
End: 2022-09-20

## 2022-03-29 RX ORDER — PREDNISONE 20 MG/1
40 TABLET ORAL DAILY
Qty: 10 TABLET | Refills: 0 | Status: SHIPPED | OUTPATIENT
Start: 2022-03-29 | End: 2022-04-03

## 2022-03-29 RX ORDER — ESTRADIOL VALERATE 10 MG/ML
10 INJECTION INTRAMUSCULAR ONCE
COMMUNITY

## 2022-03-29 NOTE — PATIENT INSTRUCTIONS
Suspect contact dermatitis and allergy from wooden earrings now discontinued with secondary impetigo ( infection)  and urticaria ( allergic rash)  and reactive lymph nodes in neck   Scattered red itchy similar rash right arm , left arm, right back of hand and left chest.   Dry skin scrotum is not new and suspect unrelated eczema   Mild irritation penis skin likely from self inoculation from head and neck to the area  Currently suspicion for STD low but can consider checking if not better  Will treat with prednisone 20 mg 2 tablets daily for 5 days with food.  Counseled it may increase anxiety and blood pressure.  Also Keflex 500 twice a day for 7 days to cover any bacterial infection due to secondary impetiginization noted on your lobes and mupirocin topical ointment to cover strep and staph on open oozing skin especially of your lobes.  Good lubrication of rest of skin and Zyrtec 10 mg daily for 7 days to prevent itching and may use hydroxyzine he has as needed on top of that.  BP elevated from anxiety, recheck normal  On spironolactone for gender care by planned parent burks.  Unsure recent hormone medications are contributing to current skin issues.  Prednisone may raise blood pressure  Eat less salt, take with food  Zyrtec 10 mg to prevent itching  May use hydroxyzine you have to stop itching as needed on top of that  Dermatology referral placed to further evaluate and treat  If gets worse like lip and tongue swelling go to the ER / urgent care

## 2022-03-29 NOTE — PROGRESS NOTES
Assessment & Plan     Contact dermatitis due to other agent, unspecified contact dermatitis type  Suspect contact dermatitis and allergy from wooden earrings now discontinued, with secondary impetigo and urticaria and reactive lymph nodes in neck   Scattered red itchy similar rash right arm , left arm, right back of hand and left chest.   Dry skin scrotum is not new and suspect unrelated eczema   Mild irritation penis skin likely from self inoculation from head and neck to the area  Currently suspicion for STD low but can consider checking if not better  Will treat with prednisone 20 mg 2 tablets daily for 5 days with food.  Counseled it may increase anxiety and blood pressure.  Also Keflex 500 twice a day for 7 days to cover any bacterial infection due to secondary impetiginization noted on your lobes and mupirocin topical ointment to cover strep and staph on open oozing skin especially of your lobes.  Good lubrication of rest of skin and Zyrtec 10 mg daily for 7 days to prevent itching and may use hydroxyzine he has as needed on top of that.  BP elevated from anxiety, recheck normal  On spironolactone for gender care by [facility].  Unsure recent hormone medications are contributing to current skin issues.  Prednisone may raise blood pressure  Eat less salt, take with food  Zyrtec 10 mg to prevent itching  May use hydroxyzine you have to stop itching as needed on top of that  Dermatology referral placed to further evaluate and treat  If gets worse like lip and tongue swelling go to the ER / urgent care   - Adult Dermatology Referral; Future  - predniSONE (DELTASONE) 20 MG tablet; Take 2 tablets (40 mg) by mouth daily for 5 days    Impetigo  - Adult Dermatology Referral; Future  - cephALEXin (KEFLEX) 500 MG capsule; Take 1 capsule (500 mg) by mouth 2 times daily for 7 days  - mupirocin (BACTROBAN) 2 % external ointment; Apply topically 3 times daily Apply to skin on ears    Rash and nonspecific skin  "eruption  - Adult Dermatology Referral; Future    Reactive lymphadenopathy  Suspect due to recent rash should improve with time.  - Adult Dermatology Referral; Future    Itching  - cetirizine (ZYRTEC) 10 MG tablet; Take 1 tablet (10 mg) by mouth daily for 7 days    Substance abuse in remission (H)  On Suboxone under care of addiction medicine    PATEL (generalized anxiety disorder)  Under increased stress recently but reports managing off Cymbalta and had not required propanolol in a while    Elevated blood pressure reading without diagnosis of hypertension  Is under stress, recheck better.  Also noted on spironolactone for gender care by [facility] which may help with blood pressure to      Review of the result(s) of each unique test - labs since 2021  Diagnosis or treatment significantly limited by social determinants of health - Anxiety, mental health, history of drug abuse, new to this provider,  Ordering of each unique test  Prescription drug management  53 minutes spent on the date of the encounter doing chart review, history and exam, documentation and further activities per the note     BMI:   Estimated body mass index is 29.57 kg/m  as calculated from the following:    Height as of 7/8/21: 1.816 m (5' 11.5\").    Weight as of this encounter: 97.5 kg (215 lb).   Weight management plan: Patient was referred to their PCP to discuss a diet and exercise plan.    Regular exercise  See Patient Instructions    Return in about 2 weeks (around 4/12/2022) for Follow up, Routine preventive, in person PCP Dr Rodriguez.    Mary Ann Meek MD  [facility]    Constantino Rubalcava is a 36 year old who presents for the following health issues     History of Present Illness       Reason for visit:  Possible skin infection  Symptom onset:  1-3 days ago  Symptoms include:  Swollen warm skin, weeping skin  Symptom intensity:  Moderate  Symptom progression:  Worsening  Had these symptoms before:  No  What " makes it worse:  No  What makes it better:  No    He eats 2-3 servings of fruits and vegetables daily.He consumes 1 sweetened beverage(s) daily.He exercises with enough effort to increase his heart rate 20 to 29 minutes per day.  He exercises with enough effort to increase his heart rate 6 days per week.   He is taking medications regularly.       36-year-old gentleman history of substance abuse in remission on Suboxone previously on Cymbalta and Inderal naproxen and tizanidine under care of [facility] addiction medicine, PATEL currently on no meds previously given hydroxyzine as needed, history of chronic neck and back pain history of cervical DDD and leg length discrepancy in the past, history of prior abdominal pain, history of endoscopic resection of cranial tumor benign in 2017, resolved CSF leak, resolved fullness in the ears, history of prior right shoulder surgery, and recurrent boils in the past, s/p shoulder surgery seen by PCP  7/20/2021 for preop for left SLAP injury.  [facility] shows Suboxone 8/2 film #90 was given 3/1/2022.  Last testosterone given November 30, 2021.  Last lorazepam given 1 year ago in 2021.    Appointment made today for acute visit of skin concern.  New to this provider.  He ordered a pair of exotic wood earrings made of hardwood and put them in last week and a day and a half of wearing them he started with a itchiness and rash on his earlobes on 3/24/2021.  By 3/25 earlobes are swollen and itchy and he took them out.  Usually in the past if similar episode would happen would take out earrings put vitamin E oil and that would take care of it so he did that but the swelling continued and he started excreting anibal clear fluid like when he had poison ivy.  Now is going on 3 days.  Its not only involving the skin of his earlobes and year but also extended down into his neck bilaterally and his glands feel swollen in his neck.  He also has noted patches of red rash that are itchy  similar to what is on his neck and how it started on his years on his right upper arm left arm chest groin area.      He has also had a dry patch of skin on his left scrotum several months.  That would involve mild itching with silvery dry skin similar to the eczema/psoriasis he has had on his scalp in the past.  So he did not think about it at the time but now he second-guessing if this is related to the issue of skin on his ears.  And now not sure if what is going on with his ears and if skin elsewhere is related to that or not.  He recalls that it to have begun with warm swelling burning itching.  He does have a history of herpes.  There are no open sores or blisters.  He reports no current concern for STD.  The skin of the glans penis and shaft was slightly also irritated and red though improved since the weekend.    Red itchy rash that is flat is on the back of his right hand as well.  When he is doing everything he can to prevent itching himself.  He reports no new pets or clothing or lotion or soaps.  He has had no contact to poison ivy that he knows of.  He reports no new medications recently but then reports he started estradiol cypionate 3 months ago along with spironolactone and finasteride for transgender care through [facility] and recently meds were adjusted and estradiol cypionate will be changing to evaluate form due to cost.  He gets this hormone as an injection that he gives himself once a week on his thigh.  He does not correlate his current skin issues with these medications.  He did have some leftover hydroxyzine and he used it over the weekend to help him sleep and so it might have helped the itching as well.  He took a few of those 10 mg pills he thinks on Saturday and Sunday.    Currently reports no fever or chills, felt tired, no headache, feels some dizziness since rash in neck and head, lightheadedness  better since over the weekend , no double vision, has astigmatism and blurry  vision, no facial pain, no inner earache, no sore throat, runny nose, post nasal drip, no trouble hearing, smelling, tasting or swallowing, no cough , no chest pain, trouble breathing or palpitations, No abdominal pain, heart burn, reflux, nausea or vomiting or diarrhea or constipation, resolved upset stomach 3 days ago , from eating late but it improved he had something to eat and did not recur.  Reports no blood in stools or black stools, no weight loss or night sweats. No dysuria, hematuria, frequency, urgency, hesitancy, incontinence, No pelvic complaints.     BP elevated, but reports that is due to being stressed from current illness and also vehicle issues that he has had to deal with and feeling so itchy.  Recheck blood pressure end of visit was better.    Substance abuse in remission on Suboxone managed by [facility] no longer on Cymbalta.    PATEL reports manageable has not required propanolol in a while.    Plans to follow-up with his primary but here today for this acute concern    Review of Systems   Constitutional, HEENT, cardiovascular, pulmonary, GI, , musculoskeletal, neuro, skin, endocrine and psych systems are negative, except as otherwise noted.      Objective    /88   Pulse 94   Temp 98.4  F (36.9  C) (Temporal)   Resp 16   Wt 97.5 kg (215 lb)   SpO2 96%   BMI 29.57 kg/m    Body mass index is 29.57 kg/m .  Physical Exam   GENERAL: healthy, alert, no distress and over weight  EYES: Eyes grossly normal to inspection, PERRL and conjunctivae and sclerae normal  HENT: ear canals and TM's normal, nose and mouth without ulcers or lesions, earlobes and some circumference of the external auditory meatus looks inflamed red beefy with secondary impetigo and oozing clear liquid.  NECK: bilateral anterior cervical adenopathy, no asymmetry, masses, or scars, thyroid normal to palpation, trachea midline and normal to palpation and no carotid bruits  RESP: lungs clear to auscultation - no  rales, rhonchi or wheezes  CV: regular rate and rhythm, normal S1 S2, no S3 or S4, no murmur, click or rub, no peripheral edema and peripheral pulses strong  ABDOMEN: soft, nontender   (male): testicles normal without atrophy or masses, no hernias, penis normal without urethral discharge and skin of penis under the foreskin looks a bit erythematous.  No sores noted  MS: no gross musculoskeletal defects noted, no edema  SKIN: B/l earlobes and some circumference of the external auditory meatus looks inflamed red beefy with secondary impetigo and oozing clear liquid, has macular red confluent patches of rash bilateral neck, scattered rash few millimeters year in the right arm left arm left chest right dorsum hand.  Does not max is itchy.  Skin of penis under the foreskin looks a bit erythematous.  No sores noted. On dorsum left scrotum there is a like scaly dry skin that is not inflamed or red.  Bilateral nipple piercing's and several tattoos.  NEURO: Normal strength and tone, mentation intact and speech normal  PSYCH: mentation appears normal, affect normal/bright, anxious, fatigued, judgement and insight intact and appearance well groomed    No results found for any visits on 03/29/22.

## 2022-04-08 ENCOUNTER — MYC MEDICAL ADVICE (OUTPATIENT)
Dept: FAMILY MEDICINE | Facility: CLINIC | Age: 37
End: 2022-04-08
Payer: COMMERCIAL

## 2022-04-08 DIAGNOSIS — L01.00 IMPETIGO: ICD-10-CM

## 2022-04-08 RX ORDER — MUPIROCIN 20 MG/G
OINTMENT TOPICAL 3 TIMES DAILY
Qty: 30 G | Refills: 0 | Status: SHIPPED | OUTPATIENT
Start: 2022-04-08 | End: 2022-09-20

## 2022-04-08 NOTE — TELEPHONE ENCOUNTER
Reason for Call:  Medication or medication refill: Refill    Do you use a [facility]?  Name of the pharmacy and phone number for the current request:  [facility] - [facility] - [phone]    Name of the medication requested: Mupirocin 2%     Other request: Patients infection that was on ears/neck has now spread across left thigh. Is hoping for a refill on ointment he was originally prescribed.    Can we leave a detailed message on this number? YES    Phone number patient can be reached at: Home number on file [phone] (home), however patient is unable to break away from work so [employer] messages work as well    Call taken on 4/8/2022 at 2:59 PM by Bella Valdez

## 2022-04-08 NOTE — TELEPHONE ENCOUNTER
Has a visit scheduled for 4/13/2022 but is out of meds can you refill until visit? Sign off on med if ok.    Thank you

## 2022-04-13 ENCOUNTER — OFFICE VISIT (OUTPATIENT)
Dept: FAMILY MEDICINE | Facility: CLINIC | Age: 37
End: 2022-04-13
Payer: COMMERCIAL

## 2022-04-13 VITALS
TEMPERATURE: 97.5 F | WEIGHT: 216 LBS | DIASTOLIC BLOOD PRESSURE: 94 MMHG | RESPIRATION RATE: 16 BRPM | OXYGEN SATURATION: 100 % | HEART RATE: 88 BPM | BODY MASS INDEX: 29.71 KG/M2 | SYSTOLIC BLOOD PRESSURE: 146 MMHG

## 2022-04-13 DIAGNOSIS — L01.00 IMPETIGO: Primary | ICD-10-CM

## 2022-04-13 DIAGNOSIS — L29.9 ITCHING: ICD-10-CM

## 2022-04-13 PROCEDURE — 99213 OFFICE O/P EST LOW 20 MIN: CPT | Performed by: STUDENT IN AN ORGANIZED HEALTH CARE EDUCATION/TRAINING PROGRAM

## 2022-04-13 RX ORDER — DOXYCYCLINE 100 MG/1
100 CAPSULE ORAL 2 TIMES DAILY
Qty: 14 CAPSULE | Refills: 0 | Status: SHIPPED | OUTPATIENT
Start: 2022-04-13 | End: 2022-04-20

## 2022-04-13 RX ORDER — CETIRIZINE HYDROCHLORIDE 10 MG/1
10 TABLET ORAL DAILY
Qty: 14 TABLET | Refills: 0 | Status: SHIPPED | OUTPATIENT
Start: 2022-04-13

## 2022-04-13 RX ORDER — DOXYCYCLINE 100 MG/1
100 CAPSULE ORAL 2 TIMES DAILY
Qty: 14 CAPSULE | Refills: 0 | Status: CANCELLED | OUTPATIENT
Start: 2022-04-13 | End: 2022-04-20

## 2022-04-13 RX ORDER — MUPIROCIN 20 MG/G
OINTMENT TOPICAL 3 TIMES DAILY
Qty: 15 G | Refills: 0 | Status: SHIPPED | OUTPATIENT
Start: 2022-04-13 | End: 2022-09-20

## 2022-04-13 NOTE — PATIENT INSTRUCTIONS
Restart topical antibiotics on the thigh. OK to use hydrocortisone OTC cream for itch. Also OK to restart Zyrtec for itching. I have sent a new prescription.  If the rash on your thigh is starting look more red, hot and/or inflamed, then please take doxycyline for one week.   I would also recommend taking a probiotic for a couple of months.     Be seen in the Urgent care or ED if any new fever, chills, nausea, vomiting, rapidly spreading redness/rash.

## 2022-04-13 NOTE — PROGRESS NOTES
Assessment & Plan     Impetigo  Itching  Impetigo of ears and neck has resolved. He had a similar appearing patch on the left thigh that has improved since last week in appearance but is still persistently erythematous and itchy, large in size. Discussed this is probably resolving skin infection and I would advise treating with topical antibiotics for an additional week. OK to use Zyrtec and topical steroid as needed for itching. If symptoms do not improve after 3 days or if any worsening prior to then, start 1 week course of doxycycline. Also supplement with probiotics. See patient instructions.   - doxycycline hyclate (VIBRAMYCIN) 100 MG capsule  Dispense: 14 capsule; Refill: 0  - mupirocin (BACTROBAN) 2 % external ointment  Dispense: 15 g; Refill: 0  - cetirizine (ZYRTEC) 10 MG tablet  Dispense: 14 tablet; Refill: 0    Vashti Rodriguez MD  [facility]    Constantino Rubalcava is a 36 year old who presents for the following health issues     History of Present Illness       Reason for visit:  Follow up appointment for the skin infection i was recently treated for.  Symptom onset:  More than a month  Symptoms include:  Itching, swelling, discharge from skin  Symptom intensity:  Moderate  Symptom progression:  Improving  Had these symptoms before:  Yes  Has tried/received treatment for these symptoms:  Yes  Previous treatment was successful:  No  What makes it worse:  Scratching, heat  What makes it better:  Cold pack on the skin helps a bit, not much though.    He eats 2-3 (Incomplete) servings of fruits and vegetables daily.He consumes 0 (Incomplete) sweetened beverage(s) daily.He exercises with enough effort to increase his heart rate 20 to 29 (Incomplete) minutes per day.  He exercises with enough effort to increase his heart rate 6 (Incomplete) days per week.   He is taking medications regularly.     He was seen on 3/29 for skin concern and diagnosed with contact dermatitis and  secondary impetigo due to allergy from wooden earrings. He was treated with prednisone and Keflex, mupirocin.    Rash cleared from his neck upwards after the oral and topical antibiotics. Still has some mild redness and itching. Also had a patch on his inner left upper arm that improved. However, there is a lesion on his left thigh that spread in size. That lesion is very itchy.     Ran out of mupirocin on Friday. He has used OTC hydrocortisone a few times during intense episodes of itching. He no longer has Zyrtec either.     Recalls a similar episode 6 months ago on his ear that resolved within a day or two.           Objective    BP (!) 146/94 (BP Location: Right arm, Patient Position: Chair, Cuff Size: Adult Regular)   Pulse 88   Temp 97.5  F (36.4  C) (Temporal)   Resp 16   Wt 98 kg (216 lb)   SpO2 100%   BMI 29.71 kg/m    Body mass index is 29.71 kg/m .  Physical Exam   GENERAL: healthy, alert and no distress  EYES: Eyes grossly normal to inspection, PERRL and conjunctivae and sclerae normal  HEENT: Ears are normal in appearance bilaterally  NECK: no adenopathy, no asymmetry, masses, or scars and thyroid normal to palpation  RESP: respirations unlabored  MS: extremities normal- no gross deformities noted  SKIN: mild erythema on his neck, no bullous lesions or cellulitis, no rash on ears; mild to moderately erythematous patch left mid thigh without vesicles or bullae and non-tender nor hot to touch, no crepitus  NEURO: Normal strength and tone, mentation intact and speech normal  PSYCH: mentation appears normal, affect normal/bright

## 2022-05-10 ENCOUNTER — MYC MEDICAL ADVICE (OUTPATIENT)
Dept: FAMILY MEDICINE | Facility: CLINIC | Age: 37
End: 2022-05-10
Payer: COMMERCIAL

## 2022-05-10 DIAGNOSIS — F41.1 GAD (GENERALIZED ANXIETY DISORDER): ICD-10-CM

## 2022-05-10 RX ORDER — DULOXETIN HYDROCHLORIDE 60 MG/1
CAPSULE, DELAYED RELEASE ORAL
Qty: 90 CAPSULE | Refills: 0 | Status: SHIPPED | OUTPATIENT
Start: 2022-05-10 | End: 2022-09-20

## 2022-05-10 NOTE — TELEPHONE ENCOUNTER
Dr. Rodriguez- pt wanting to go back on 60mg duloxetine.     Pended    MORGAN EastmanN RN  [facility]

## 2022-08-14 ENCOUNTER — HEALTH MAINTENANCE LETTER (OUTPATIENT)
Age: 37
End: 2022-08-14

## 2022-09-20 ENCOUNTER — OFFICE VISIT (OUTPATIENT)
Dept: PEDIATRICS | Facility: CLINIC | Age: 37
End: 2022-09-20
Payer: COMMERCIAL

## 2022-09-20 ENCOUNTER — MYC MEDICAL ADVICE (OUTPATIENT)
Dept: FAMILY MEDICINE | Facility: CLINIC | Age: 37
End: 2022-09-20

## 2022-09-20 VITALS
HEART RATE: 86 BPM | RESPIRATION RATE: 16 BRPM | WEIGHT: 219.4 LBS | HEIGHT: 72 IN | DIASTOLIC BLOOD PRESSURE: 80 MMHG | SYSTOLIC BLOOD PRESSURE: 124 MMHG | BODY MASS INDEX: 29.72 KG/M2 | TEMPERATURE: 98.9 F | OXYGEN SATURATION: 99 %

## 2022-09-20 DIAGNOSIS — F19.10 SUBSTANCE ABUSE (H): ICD-10-CM

## 2022-09-20 DIAGNOSIS — Z13.220 SCREENING FOR HYPERLIPIDEMIA: ICD-10-CM

## 2022-09-20 DIAGNOSIS — L25.9 CONTACT DERMATITIS, UNSPECIFIED CONTACT DERMATITIS TYPE, UNSPECIFIED TRIGGER: Primary | ICD-10-CM

## 2022-09-20 DIAGNOSIS — Z79.899 ENCOUNTER FOR LONG-TERM (CURRENT) USE OF MEDICATIONS: ICD-10-CM

## 2022-09-20 PROBLEM — M54.2 CHRONIC NECK PAIN: Status: RESOLVED | Noted: 2021-06-16 | Resolved: 2022-09-20

## 2022-09-20 PROBLEM — G89.29 CHRONIC NECK PAIN: Status: RESOLVED | Noted: 2021-06-16 | Resolved: 2022-09-20

## 2022-09-20 PROBLEM — Z87.898 HISTORY OF SUBSTANCE USE DISORDER: Status: RESOLVED | Noted: 2021-07-28 | Resolved: 2022-09-20

## 2022-09-20 PROCEDURE — 99213 OFFICE O/P EST LOW 20 MIN: CPT | Performed by: INTERNAL MEDICINE

## 2022-09-20 RX ORDER — TRIAMCINOLONE ACETONIDE 1 MG/G
CREAM TOPICAL 2 TIMES DAILY
Qty: 30 G | Refills: 1 | Status: SHIPPED | OUTPATIENT
Start: 2022-09-20

## 2022-09-20 RX ORDER — TRIAMCINOLONE ACETONIDE 1 MG/G
CREAM TOPICAL 2 TIMES DAILY
Qty: 30 G | Refills: 1 | Status: SHIPPED | OUTPATIENT
Start: 2022-09-20 | End: 2022-09-20

## 2022-09-20 ASSESSMENT — PAIN SCALES - GENERAL: PAINLEVEL: MODERATE PAIN (4)

## 2022-09-20 NOTE — PATIENT INSTRUCTIONS
Begin with triamcinolone 0.1% cream (kenalog) twice daily for 2 weeks.    Take over-the-counter Zyrtec (cetirizine) 20 mg every day for 2 weeks.    Use cool washcloth or ice.    Jos Garduno MD  Internal Medicine and Pediatrics

## 2022-09-20 NOTE — PROGRESS NOTES
"  Assessment & Plan       Contact dermatitis, unspecified contact dermatitis type, unspecified trigger    Seems very localized, not systemic.  LIkely contact.  Very itchy.    Patient Instructions   Begin with triamcinolone 0.1% cream (kenalog) twice daily for 2 weeks.    Take over-the-counter Zyrtec (cetirizine) 20 mg every day for 2 weeks.    Use cool washcloth or ice.    Jos Garduno MD  Internal Medicine and Pediatrics        - triamcinolone (KENALOG) 0.1 % external cream; Apply topically 2 times daily             BMI:   Estimated body mass index is 30.17 kg/m  as calculated from the following:    Height as of this encounter: 1.816 m (5' 11.5\").    Weight as of this encounter: 99.5 kg (219 lb 6.4 oz).       See Patient Instructions    No follow-ups on file.    Jos Garduno MD  [facility] SHERRI Rubalcava is a 37 year old, presenting for the following health issues:  Derm Problem      HPI     Rash  Onset/Duration: 4-5 days  ago  Description  Location: on both arms and his face  Character: round, raised, burning, red  Itching: severe  Intensity:  moderate  Progression of Symptoms:  worsening  Accompanying signs and symptoms:   Fever: No  Body aches or joint pain: Always has body aches and joint pains  Sore throat symptoms: No  Recent cold symptoms: YES  History:           Previous episodes of similar rash: had a rash a couple months ago, not sure if it is the same  New exposures:  None  Recent travel: No  Exposure to similar rash: No  Precipitating or alleviating factors: none  Therapies tried and outcome: Bactroban, an antifungal    Began 5-6 days ago with rash on left side of neck.  Itchy.  Spread to left wrist and left forearm.  Also in elbows.  Began to blister some, and then got red.  Very itchy.      Began to spread to face and left side of neck as well.      Has tried bactroban and antifungal cream.          Review of Systems         Objective    There were no vitals taken for this " visit.  There is no height or weight on file to calculate BMI.  Physical Exam

## 2022-09-21 NOTE — TELEPHONE ENCOUNTER
Writer responded via [employer].    MORGAN LopezN, RN  [facility]

## 2022-11-19 ENCOUNTER — HEALTH MAINTENANCE LETTER (OUTPATIENT)
Age: 37
End: 2022-11-19

## 2023-09-10 ENCOUNTER — HEALTH MAINTENANCE LETTER (OUTPATIENT)
Age: 38
End: 2023-09-10

## 2024-11-03 ENCOUNTER — HEALTH MAINTENANCE LETTER (OUTPATIENT)
Age: 39
End: 2024-11-03

## (undated) DEVICE — KIT ACCESSORY LUMBAR DRAIN 910121

## (undated) DEVICE — PREP SKIN SCRUB TRAY 4461A

## (undated) DEVICE — DRAPE MAYO STAND 23X54 8337

## (undated) DEVICE — COVER CAMERA VIDEO LASER 9X96" 04-CC227

## (undated) DEVICE — CATH TRAY FOLEY SURESTEP 16FR W/TMP PRB STLK LATEX A319416AM

## (undated) DEVICE — DECANTER VIAL 2006S

## (undated) DEVICE — PREP DURAPREP 26ML APL 8630

## (undated) DEVICE — BUR BALL 3MM FLUTED 15CM ANSPACH MA15-3SB

## (undated) DEVICE — SPONGE SURGIFOAM 100 1974

## (undated) DEVICE — SU ETHILON 2-0 FS 18" 664H

## (undated) DEVICE — PITCHER STERILE 1000ML  SSK9004A

## (undated) DEVICE — PACKING NASAL 4.5CM W/O AIRWAY 440400

## (undated) DEVICE — SOL NACL 0.9% IRRIG 1000ML BOTTLE 2F7124

## (undated) DEVICE — BLADE KNIFE SURG 15 371115

## (undated) DEVICE — SPONGE COTTONOID 1/2X3" 20-07S

## (undated) DEVICE — SYR 10ML FINGER CONTROL W/O NDL 309695

## (undated) DEVICE — ESU ELEC NDL 6" COATED/INSULATED E1465-6

## (undated) DEVICE — DECANTER BAG 2002S

## (undated) DEVICE — DRAPE WARMER 66X44" ORS-300

## (undated) DEVICE — STRAP ARMBOARD IV POSITIONING 1.5X32" VELCRO 31142980

## (undated) DEVICE — SHUNT BECKER EXTERNAL DRAIN 46128

## (undated) DEVICE — MARKER SPHERES PASSIVE MEDT PACK 5 8801075

## (undated) DEVICE — RX BACITRACIN OINTMENT 0.9G 1/32OZ CUR001109

## (undated) DEVICE — ESU GROUND PAD ADULT W/CORD E7507

## (undated) DEVICE — BLADE KNIFE SURG 11 371111

## (undated) DEVICE — TRACKER PATIENT NON-INVASIVE AXIEM 9734887XOM

## (undated) DEVICE — SU SILK 0 TIE 6X18" A186H

## (undated) DEVICE — ESU ELEC BLADE 6" COATED E1450-6

## (undated) DEVICE — Device

## (undated) DEVICE — SUCTION MANIFOLD DORNOCH ULTRA CART UL-CL500

## (undated) DEVICE — ENDO SHEATH STORZ SHARPSITE ENDOSCRUB 30DEG 4MM 1912010

## (undated) DEVICE — NDL SPINAL 25GA 3.5" QUINCKE 405180

## (undated) DEVICE — SOL BENZOIN 0.5OZ

## (undated) DEVICE — TUBING IRRIGATOR STRAIGHTSHOT XPS 1895522

## (undated) DEVICE — ESU NDL COLORADO MICRO 3CM STR N103A

## (undated) DEVICE — ANTIFOG SOLUTION W/FOAM PAD 31142527

## (undated) DEVICE — SPONGE SURGIFOAM 01GM POWDER 1978

## (undated) DEVICE — SU ETHILON 3-0 PS-1 18" 1663H

## (undated) DEVICE — ESU SUCTION CAUTERY 10FR FOOT CONTROL E2505-10FR

## (undated) DEVICE — WIPE INSTRUMENT MEROCEL 400200

## (undated) DEVICE — PACK NEURO MINOR UMMC SNE32MNMU4

## (undated) DEVICE — DRAPE IOBAN INCISE 13X13" 6640EZ

## (undated) DEVICE — PREP CHLORAPREP CLEAR 3ML 260400

## (undated) DEVICE — BUR BALL 3MM DIAMOND COARSE EXT ANSPACH S-3DC-G1

## (undated) DEVICE — BUR BALL 4MM DIAMOND 15CM ANSPACH MA15-4D

## (undated) DEVICE — ENDO INSERT ESU BIPOLAR FCP STORZ VERTICAL CLOSING 28164FGL

## (undated) DEVICE — DRSG STERI STRIP 1/2X4" R1547

## (undated) DEVICE — LINEN TOWEL PACK X30 5481

## (undated) DEVICE — NDL 25GA 2"  8881200441

## (undated) DEVICE — BUR BALL 3MM DIAMOND 15CM ANSPACH MA15-3SD

## (undated) DEVICE — DRAPE SHEET MED 44X70" 9355

## (undated) DEVICE — BLADE SINUS TRICUT STR 4MMX13CM FUSION ROTATE W/TRACKING

## (undated) DEVICE — SPONGE COTTONOID 1/2X1/2" 20-04S

## (undated) DEVICE — DRSG TELFA 3X8" 1238

## (undated) DEVICE — SYR 10ML LL W/O NDL

## (undated) DEVICE — ENDO SHEATH STORZ SHARPSITE ENDOSCRUB 0DEG 4MM 1912000

## (undated) DEVICE — SYR 03ML LL W/O NDL 309657

## (undated) DEVICE — LINEN TOWEL PACK X6 WHITE 5487

## (undated) DEVICE — SUCTION CATH AIRLIFE TRI-FLO W/CONTROL PORT 14FR  T60C

## (undated) DEVICE — SOL NACL 0.9% INJ 1000ML BAG 2B1324X

## (undated) DEVICE — DRSG GAUZE 4X4" TRAY 6939

## (undated) DEVICE — SPLINT NASAL DOYLE BREATHEASY 20-10500

## (undated) DEVICE — EYE PREP BETADINE 5% SOLUTION 30ML 0065-0411-30

## (undated) DEVICE — SOL WATER IRRIG 1000ML BOTTLE 2F7114

## (undated) RX ORDER — HYDROMORPHONE HYDROCHLORIDE 1 MG/ML
INJECTION, SOLUTION INTRAMUSCULAR; INTRAVENOUS; SUBCUTANEOUS
Status: DISPENSED
Start: 2017-04-14

## (undated) RX ORDER — LABETALOL HYDROCHLORIDE 5 MG/ML
INJECTION, SOLUTION INTRAVENOUS
Status: DISPENSED
Start: 2017-04-14

## (undated) RX ORDER — SODIUM CHLORIDE 9 MG/ML
INJECTION, SOLUTION INTRAVENOUS
Status: DISPENSED
Start: 2017-04-14

## (undated) RX ORDER — FENTANYL CITRATE 50 UG/ML
INJECTION, SOLUTION INTRAMUSCULAR; INTRAVENOUS
Status: DISPENSED
Start: 2017-04-14